# Patient Record
Sex: MALE | Race: WHITE | Employment: FULL TIME | URBAN - NONMETROPOLITAN AREA
[De-identification: names, ages, dates, MRNs, and addresses within clinical notes are randomized per-mention and may not be internally consistent; named-entity substitution may affect disease eponyms.]

---

## 2017-08-06 ENCOUNTER — HOSPITAL ENCOUNTER (EMERGENCY)
Age: 27
Discharge: HOME | End: 2017-08-06
Payer: MEDICAID

## 2017-08-06 VITALS
BODY MASS INDEX: 25.07 KG/M2 | WEIGHT: 190 LBS | DIASTOLIC BLOOD PRESSURE: 65 MMHG | HEART RATE: 81 BPM | RESPIRATION RATE: 16 BRPM | OXYGEN SATURATION: 99 % | TEMPERATURE: 98 F | SYSTOLIC BLOOD PRESSURE: 126 MMHG

## 2017-08-06 DIAGNOSIS — L02.91 ABSCESS: Primary | ICD-10-CM

## 2017-08-06 PROCEDURE — 99282 EMERGENCY DEPT VISIT SF MDM: CPT

## 2017-08-06 RX ORDER — PHENOL 1.4 %
10 AEROSOL, SPRAY (ML) MUCOUS MEMBRANE NIGHTLY
COMMUNITY

## 2017-08-06 RX ORDER — CETIRIZINE HYDROCHLORIDE 10 MG/1
10 TABLET ORAL DAILY
COMMUNITY
End: 2022-05-05

## 2017-08-06 RX ORDER — CEPHALEXIN 500 MG/1
500 CAPSULE ORAL 4 TIMES DAILY
Qty: 40 CAPSULE | Refills: 0 | Status: SHIPPED | OUTPATIENT
Start: 2017-08-06 | End: 2017-08-16

## 2017-08-06 RX ORDER — SULFAMETHOXAZOLE AND TRIMETHOPRIM 800; 160 MG/1; MG/1
1 TABLET ORAL 2 TIMES DAILY
Qty: 20 TABLET | Refills: 0 | Status: SHIPPED | OUTPATIENT
Start: 2017-08-06 | End: 2017-08-16

## 2017-08-06 ASSESSMENT — ENCOUNTER SYMPTOMS
WHEEZING: 0
EYE PAIN: 0
SINUS PRESSURE: 0
EYE DISCHARGE: 0
BACK PAIN: 0
RHINORRHEA: 0
TROUBLE SWALLOWING: 0
NAUSEA: 0
ROS SKIN COMMENTS: BUMP
DIARRHEA: 0
VOMITING: 0
COUGH: 0
ABDOMINAL PAIN: 0

## 2017-08-06 ASSESSMENT — PAIN DESCRIPTION - PAIN TYPE: TYPE: ACUTE PAIN

## 2017-08-06 ASSESSMENT — PAIN SCALES - WONG BAKER: WONGBAKER_NUMERICALRESPONSE: 8

## 2017-10-16 ENCOUNTER — APPOINTMENT (OUTPATIENT)
Dept: GENERAL RADIOLOGY | Age: 27
End: 2017-10-16
Payer: MEDICAID

## 2017-10-16 ENCOUNTER — HOSPITAL ENCOUNTER (EMERGENCY)
Age: 27
Discharge: HOME | End: 2017-10-16
Attending: EMERGENCY MEDICINE
Payer: MEDICAID

## 2017-10-16 VITALS
OXYGEN SATURATION: 100 % | DIASTOLIC BLOOD PRESSURE: 71 MMHG | TEMPERATURE: 98.4 F | HEART RATE: 71 BPM | SYSTOLIC BLOOD PRESSURE: 126 MMHG | RESPIRATION RATE: 16 BRPM

## 2017-10-16 DIAGNOSIS — S16.1XXA STRAIN OF NECK MUSCLE, INITIAL ENCOUNTER: Primary | ICD-10-CM

## 2017-10-16 PROCEDURE — 6370000000 HC RX 637 (ALT 250 FOR IP): Performed by: EMERGENCY MEDICINE

## 2017-10-16 PROCEDURE — 72050 X-RAY EXAM NECK SPINE 4/5VWS: CPT

## 2017-10-16 PROCEDURE — 99283 EMERGENCY DEPT VISIT LOW MDM: CPT

## 2017-10-16 RX ORDER — ACETAMINOPHEN 325 MG/1
650 TABLET ORAL ONCE
Status: COMPLETED | OUTPATIENT
Start: 2017-10-16 | End: 2017-10-16

## 2017-10-16 RX ORDER — CYCLOBENZAPRINE HCL 10 MG
5 TABLET ORAL 2 TIMES DAILY
Status: DISCONTINUED | OUTPATIENT
Start: 2017-10-16 | End: 2017-10-16 | Stop reason: HOSPADM

## 2017-10-16 RX ORDER — CYCLOBENZAPRINE HCL 10 MG
10 TABLET ORAL ONCE
Status: COMPLETED | OUTPATIENT
Start: 2017-10-16 | End: 2017-10-16

## 2017-10-16 RX ADMIN — CYCLOBENZAPRINE HYDROCHLORIDE 10 MG: 10 TABLET, FILM COATED ORAL at 19:28

## 2017-10-16 RX ADMIN — ACETAMINOPHEN 650 MG: 325 TABLET, FILM COATED ORAL at 19:28

## 2017-10-16 ASSESSMENT — PAIN SCALES - GENERAL: PAINLEVEL_OUTOF10: 8

## 2017-10-16 ASSESSMENT — PAIN DESCRIPTION - PAIN TYPE: TYPE: ACUTE PAIN

## 2017-10-16 ASSESSMENT — PAIN DESCRIPTION - ONSET: ONSET: ON-GOING

## 2017-10-16 ASSESSMENT — PAIN DESCRIPTION - FREQUENCY: FREQUENCY: CONTINUOUS

## 2017-10-16 ASSESSMENT — PAIN DESCRIPTION - ORIENTATION: ORIENTATION: POSTERIOR

## 2017-10-16 ASSESSMENT — PAIN DESCRIPTION - LOCATION: LOCATION: NECK

## 2017-10-16 ASSESSMENT — PAIN DESCRIPTION - DESCRIPTORS: DESCRIPTORS: ACHING

## 2019-08-28 ENCOUNTER — HOSPITAL ENCOUNTER (OUTPATIENT)
Dept: PHYSICAL THERAPY | Age: 29
Setting detail: THERAPIES SERIES
Discharge: HOME | End: 2019-08-28
Payer: MEDICAID

## 2019-08-28 PROCEDURE — 97161 PT EVAL LOW COMPLEX 20 MIN: CPT

## 2019-08-28 ASSESSMENT — PAIN SCALES - WONG BAKER: WONGBAKER_NUMERICALRESPONSE: 0

## 2019-08-28 NOTE — PROGRESS NOTES
Phone: [address] [place]         Fax: [phone]                      Outpatient Physical Therapy                                                                      Evaluation    Date: 2019  Patient: Marita Granados  : 1990  ACCT #: [de-identified]    Referring Practitioner: Franca Gallego CNP     Referral Date : 19    Diagnosis: Achilles tightness/pain    Treatment Diagnosis: LE tightness  Onset Date: 19  PT Insurance Information: ALAYNA    Per Physician Order  Total # of Visits to Date: 1  No Show: 1  Canceled Appointment: 0     Subjective  Subjective: Due to work schedule patient able to attend treatment 1 day per week ()  Additional Pertinent Hx: Patient with cerebral palsy which has affected his gait pattern. Patients mother states he has worn AFO's previously however currently is not due to improper fit. He has begun to toe walk promoting tightness of the posterior LE musculature. He notes discomfort with prolonged standing, walking or squatting activities. He has not formal MD follow-up; he reports being placed on muscle relaxers. PMHx includes cerebral palsy and learning disability.   Pain Screening  Patient Currently in Pain: Yes  Pain Assessment  Pain Assessment: Faces  Fishman-Baker Pain Rating: No hurt(increases to 8/10 with walking or squatting)  Social/Functional History  Lives With: Family  Ambulation Assistance: Independent  Transfer Assistance: Independent  Active : No  Occupation: Part time employment  Type of occupation: [address]: Enjoys baseball, gardening/plants       Objective  Vision  Vision: Within Functional Limits  Hearing  Hearing: Within functional limits        Strength RLE  Strength RLE: WFL  AROM RLE (degrees)  RLE AROM: WFL  RLE General AROM: HS 90 ROM to 40 terminal extension  Strength LLE  Strength LLE: WFL  AROM LLE (degrees)  LLE AROM : WFL  LLE

## 2019-08-28 NOTE — PRE-CERTIFICATION NOTE
Medicare Cap     [] Physical Therapy  [] Speech Therapy  [] Occupational therapy    *PT and Speech caps combine      $6224 Cap limit < kx modifier needed < $8722 requires pre-cert     Patient Name: Thirza Skiff  YOB: 1990     Date of [address] Name $$$ charge Daily Charge YTD   Total $   8/28/19 Eval 83.06 83.06 83.06

## 2019-09-04 ENCOUNTER — HOSPITAL ENCOUNTER (OUTPATIENT)
Dept: PHYSICAL THERAPY | Age: 29
Setting detail: THERAPIES SERIES
Discharge: HOME | End: 2019-09-04
Payer: MEDICAID

## 2019-09-04 PROCEDURE — 97110 THERAPEUTIC EXERCISES: CPT

## 2019-09-04 ASSESSMENT — PAIN SCALES - WONG BAKER: WONGBAKER_NUMERICALRESPONSE: 0

## 2019-09-04 NOTE — PRE-CERTIFICATION NOTE
Medicare Cap     [] Physical Therapy  [] Speech Therapy  [] Occupational therapy    *PT and Speech caps combine      $1435 Cap limit < kx modifier needed < $6419 requires pre-cert     Patient Name: Zhou Quiroz  YOB: 1990     Date of [address] Name $$$ charge Daily Charge YTD   Total $   8/28/19 Eval 83.06 83.06 83.06   9/4/19 Ex x 2 30.16 x 2 60.32 143.38

## 2019-09-18 ENCOUNTER — HOSPITAL ENCOUNTER (OUTPATIENT)
Dept: PHYSICAL THERAPY | Age: 29
Setting detail: THERAPIES SERIES
Discharge: HOME | End: 2019-09-18
Payer: MEDICAID

## 2019-09-18 PROCEDURE — 97110 THERAPEUTIC EXERCISES: CPT

## 2019-09-18 ASSESSMENT — PAIN SCALES - GENERAL: PAINLEVEL_OUTOF10: 2

## 2019-09-25 ENCOUNTER — HOSPITAL ENCOUNTER (OUTPATIENT)
Dept: PHYSICAL THERAPY | Age: 29
Setting detail: THERAPIES SERIES
Discharge: HOME | End: 2019-09-25
Payer: MEDICAID

## 2019-09-25 ENCOUNTER — APPOINTMENT (OUTPATIENT)
Dept: PHYSICAL THERAPY | Age: 29
End: 2019-09-25
Payer: MEDICAID

## 2019-09-25 PROCEDURE — 97110 THERAPEUTIC EXERCISES: CPT

## 2019-09-25 NOTE — PRE-CERTIFICATION NOTE
Medicare Cap     [] Physical Therapy  [] Speech Therapy  [] Occupational therapy    *PT and Speech caps combine      $1940 Cap limit < kx modifier needed < $2435 requires pre-cert     Patient Name: Stefania Chen  YOB: 1990     Date of [address] Name $$$ charge Daily Charge YTD   Total $   8/28/19 Eval 83.06 83.06 83.06   9/4/19 Ex x 2 30.16 x 2 60.32 143.38   9/18/19 Ex x 3 30.16 x 3 90.48 233.86   9/25/19 Ex x 3

## 2019-09-25 NOTE — PROGRESS NOTES
Phone: [address] [facility]      Fax: [phone]                            Outpatient Physical Therapy                                                                            Daily Note    Date: 2019  Patient Name: Shahrzad Puente        MRN: [id number]   ACCT#:  [de-identified]  : 1990  (25 y.o.)    Referring Practitioner: Cara Aguilar CNP     Referral Date : 19    Diagnosis: Achilles tightness/pain  Treatment Diagnosis: LE tightness    Onset Date: 19  PT Insurance Information: ALAYNA    Per Physician Order  Total # of Visits to Date: 4  No Show: 1  Canceled Appointment: 0  Plan of Care/Certification Expiration Date: 10/23/19    Pre-Treatment Pain:  2/10  Subjective: Due to work schedule patient able to attend treatment 1 day per week ()  Assessment  Assessment: Patient notes mild discomfort with squatting activities. Minimal discomfort with walking. Good tolerance to exercise however notes moderate tightness with manual HS stretching. Patient out of town next  week however willr esume on 10/9/19 and will assess need for continued PT based on status.   Chart Reviewed: Yes    Plan  Plan: Continue with current plan    Exercises/Modalities/Manual:  See DocFlow Sheet    Education:           Goals  (Total # of Visits to Date: 4)   Short Term Goals - Time Frame for Short term goals: 8 visits -  expires 10/23/19  Short term goal 1: Educate on home exercises for LE stretching with focus on hamstrings and Achilles  Short term goal 2: Decrease subjective LE pain to <4/10 on Fishman-Baker scale with squatting or walking activities             Long Term Goals -    Long term goal 1: LTG = STG as patient able to attend on 1x per week due to work schedule                Post Treatment Pain:  2/10    Time In: 0900    Time Out : 945        Timed Code Treatment Minutes: 40 Minutes  Total Treatment Time: 39 [id number]     Date: 9/25/2019

## 2019-10-09 ENCOUNTER — APPOINTMENT (OUTPATIENT)
Dept: PHYSICAL THERAPY | Age: 29
End: 2019-10-09
Payer: MEDICAID

## 2019-10-23 ENCOUNTER — HOSPITAL ENCOUNTER (OUTPATIENT)
Dept: PHYSICAL THERAPY | Age: 29
Setting detail: THERAPIES SERIES
Discharge: HOME | End: 2019-10-23
Payer: MEDICAID

## 2019-10-23 PROCEDURE — 97110 THERAPEUTIC EXERCISES: CPT

## 2019-10-23 ASSESSMENT — PAIN SCALES - GENERAL: PAINLEVEL_OUTOF10: 1

## 2020-01-01 ENCOUNTER — HOSPITAL ENCOUNTER (EMERGENCY)
Age: 30
Discharge: HOME | End: 2020-01-01
Attending: EMERGENCY MEDICINE
Payer: MEDICAID

## 2020-01-01 VITALS
RESPIRATION RATE: 20 BRPM | DIASTOLIC BLOOD PRESSURE: 76 MMHG | HEART RATE: 81 BPM | OXYGEN SATURATION: 97 % | BODY MASS INDEX: 29.95 KG/M2 | WEIGHT: 227 LBS | TEMPERATURE: 98.6 F | SYSTOLIC BLOOD PRESSURE: 125 MMHG

## 2020-01-01 PROCEDURE — 96372 THER/PROPH/DIAG INJ SC/IM: CPT

## 2020-01-01 PROCEDURE — 6360000002 HC RX W HCPCS: Performed by: EMERGENCY MEDICINE

## 2020-01-01 PROCEDURE — 6370000000 HC RX 637 (ALT 250 FOR IP): Performed by: EMERGENCY MEDICINE

## 2020-01-01 PROCEDURE — 99282 EMERGENCY DEPT VISIT SF MDM: CPT

## 2020-01-01 RX ORDER — FAMOTIDINE 20 MG/1
20 TABLET, FILM COATED ORAL 2 TIMES DAILY
Qty: 14 TABLET | Refills: 0 | Status: SHIPPED | OUTPATIENT
Start: 2020-01-01 | End: 2020-01-08

## 2020-01-01 RX ORDER — METHYLPREDNISOLONE SODIUM SUCCINATE 125 MG/2ML
125 INJECTION, POWDER, LYOPHILIZED, FOR SOLUTION INTRAMUSCULAR; INTRAVENOUS ONCE
Status: COMPLETED | OUTPATIENT
Start: 2020-01-01 | End: 2020-01-01

## 2020-01-01 RX ORDER — TOPIRAMATE 25 MG/1
25 TABLET ORAL 2 TIMES DAILY
COMMUNITY

## 2020-01-01 RX ORDER — DIPHENHYDRAMINE HCL 25 MG
25 TABLET ORAL ONCE
Status: COMPLETED | OUTPATIENT
Start: 2020-01-01 | End: 2020-01-01

## 2020-01-01 RX ORDER — DIPHENHYDRAMINE HCL 25 MG
25 CAPSULE ORAL EVERY 4 HOURS PRN
Qty: 30 CAPSULE | Refills: 0 | Status: SHIPPED | OUTPATIENT
Start: 2020-01-01 | End: 2020-01-06

## 2020-01-01 RX ORDER — PREDNISONE 20 MG/1
40 TABLET ORAL DAILY
Qty: 10 TABLET | Refills: 0 | Status: SHIPPED | OUTPATIENT
Start: 2020-01-01 | End: 2020-01-06

## 2020-01-01 RX ADMIN — METHYLPREDNISOLONE SODIUM SUCCINATE 125 MG: 125 INJECTION, POWDER, FOR SOLUTION INTRAMUSCULAR; INTRAVENOUS at 14:41

## 2020-01-01 RX ADMIN — DIPHENHYDRAMINE HCL 25 MG: 25 TABLET, COATED ORAL at 14:41

## 2020-01-04 NOTE — ED PROVIDER NOTES
None   Occupational History    None   Social Needs    Financial resource strain: None    Food insecurity:     Worry: None     Inability: None    Transportation needs:     Medical: None     Non-medical: None   Tobacco Use    Smoking status: Never Smoker    Smokeless tobacco: Never Used   Substance and Sexual Activity    Alcohol use: No    Drug use: None    Sexual activity: None   Lifestyle    Physical activity:     Days per week: None     Minutes per session: None    Stress: None   Relationships    Social connections:     Talks on phone: None     Gets together: None     Attends [employer] service: None     Active member of club or organization: None     Attends meetings of clubs or organizations: None     Relationship status: None    Intimate partner violence:     Fear of current or ex partner: None     Emotionally abused: None     Physically abused: None     Forced sexual activity: None   Other Topics Concern    None   Social History Narrative    None       REVIEW OF SYSTEMS    Constitutional:  Denies fever, chills, weight loss or weakness   Eyes:  Denies photophobia or discharge   HENT:  Denies sore throat or ear pain   Respiratory:  Denies cough or shortness of breath   Cardiovascular:  Denies chest pain, palpitations or swelling   GI:  Denies abdominal pain, nausea, vomiting, or diarrhea   Musculoskeletal:  Denies back pain   Skin: Complains of rash   Neurologic:  Denies headache, focal weakness or sensory changes   Endocrine:  Denies polyuria or polydypsia   Lymphatic:  Denies swollen glands   Psychiatric:  Denies depression, suicidal ideation or homicidal ideation   All systems negative except as marked. PHYSICAL EXAM    VITAL SIGNS: /76   Pulse 81   Temp 98.6 °F (37 °C) (Oral)   Resp 20   Wt 227 lb (103 kg)   SpO2 97%   BMI 29.95 kg/m²    Constitutional:  Well developed, Well nourished, No acute distress, Non-toxic appearance.    HENT:  Normocephalic, Atraumatic, Bilateral external ears normal, Oropharynx moist, No oral exudates, uvula normal, nose normal. Neck- Normal range of motion, No tenderness, Supple, No stridor. Eyes:  PERRL, EOMI, Conjunctiva normal, No discharge. Respiratory:  Normal breath sounds, No respiratory distress, No wheezing, No chest tenderness. Cardiovascular:  Normal heart rate, Normal rhythm, No murmurs, No rubs, No gallops. GI:  Bowel sounds normal, Soft, No tenderness, No masses, No pulsatile masses. : No CVA tenderness. Musculoskeletal:  Intact distal pulses, No edema, No tenderness, No cyanosis, No clubbing. Good range of motion in all major joints. No tenderness to palpation or major deformities noted. Back- No tenderness. Integument:  Warm, Dry, maculopapular blanching rash present on anterior chest and posterior chest area, bilateral lower extremity and upper extremity. Excoriation marks present  lymphatic:  No lymphadenopathy noted. Neurologic:  Alert & oriented x 3, Normal motor function, Normal sensory function, No focal deficits noted. Psychiatric:  Affect normal, Judgment normal, Mood normal.         RADIOLOGY    No orders to display       REEVALUATION   Patient feels better after the Solu-Medrol and Benadryl.             Summation      Patient Course:     ED Medications administered this visit:    Medications   methylPREDNISolone sodium (SOLU-MEDROL) injection 125 mg (125 mg Intramuscular Given 1/1/20 1441)   diphenhydrAMINE (BENADRYL) tablet 25 mg (25 mg Oral Given 1/1/20 1441)       New Prescriptions from this visit:    Discharge Medication List as of 1/1/2020  2:27 PM      START taking these medications    Details   diphenhydrAMINE (BENADRYL) 25 MG capsule Take 1 capsule by mouth every 4 hours as needed for Itching or Allergies, Disp-30 capsule, R-0Print      predniSONE (DELTASONE) 20 MG tablet Take 2 tablets by mouth daily for 5 days, Disp-10 tablet, R-0Print      famotidine (PEPCID) 20 MG tablet Take 1 tablet by mouth 2 times daily for 7 days, Disp-14 tablet, R-0Print             Follow-up:  ALEJANDRO Finley  [facility] [place] [id number]    Call in 1 day          Final Impression:   1.  Allergic dermatitis               (Please note that portions of this note were completed with a voice recognition program.  Efforts were made to edit the dictations but occasionally words are mis-transcribed.)            Gwendolyn Hill MD  01/04/20 0018

## 2020-01-29 ENCOUNTER — APPOINTMENT (OUTPATIENT)
Dept: PHYSICAL THERAPY | Age: 30
End: 2020-01-29
Payer: MEDICAID

## 2020-02-26 ENCOUNTER — HOSPITAL ENCOUNTER (OUTPATIENT)
Dept: PHYSICAL THERAPY | Age: 30
Setting detail: THERAPIES SERIES
Discharge: HOME | End: 2020-02-26
Payer: MEDICAID

## 2020-02-26 PROCEDURE — 97161 PT EVAL LOW COMPLEX 20 MIN: CPT

## 2020-02-26 ASSESSMENT — PAIN SCALES - GENERAL: PAINLEVEL_OUTOF10: 2

## 2020-03-11 ENCOUNTER — HOSPITAL ENCOUNTER (OUTPATIENT)
Dept: PHYSICAL THERAPY | Age: 30
Setting detail: THERAPIES SERIES
Discharge: HOME | End: 2020-03-11
Payer: MEDICAID

## 2020-03-11 PROCEDURE — 97113 AQUATIC THERAPY/EXERCISES: CPT

## 2020-03-11 ASSESSMENT — PAIN SCALES - GENERAL: PAINLEVEL_OUTOF10: 2

## 2020-03-18 ENCOUNTER — HOSPITAL ENCOUNTER (OUTPATIENT)
Dept: PHYSICAL THERAPY | Age: 30
Setting detail: THERAPIES SERIES
Discharge: HOME | End: 2020-03-18
Payer: MEDICAID

## 2020-03-18 PROCEDURE — 97113 AQUATIC THERAPY/EXERCISES: CPT

## 2020-03-18 ASSESSMENT — PAIN SCALES - GENERAL: PAINLEVEL_OUTOF10: 2

## 2020-03-18 NOTE — PROGRESS NOTES
Phone: [address]      Fax: [phone]                            Outpatient Physical Therapy                                                                            Daily Note    Date: 3/18/2020  Patient Name: Linda Titus        MRN: [id number]   ACCT#:  [de-identified]  : 1990  (30 y.o.)    Referring Practitioner: Dr. Kristy Blackman    Referral Date : 12/10/19    Diagnosis: Bilateral leg pain  Treatment Diagnosis: LE pain    Onset Date: 12/10/19  PT Insurance Information: ALAYNA    Per Physician Order  Total # of Visits to Date: 3  No Show: 0  Canceled Appointment: 0  Plan of Care/Certification Expiration Date: 20    Pre-Treatment Pain:  2/10  Subjective: Due to work schedule patient able to attend treatment 1 day per week ()  Assessment  Assessment: Patient reports LE pain is a 2/10 this morning. Continued with aquatic exercises as outlined. Patient continues to require verbal cueing to copmlete exercises with proper form and remain on task. Following session patient reports LE pain is still a 2/10.   Chart Reviewed: Yes    Plan  Plan: Continue with current plan    Exercises/Modalities/Manual:  See DocFlow Sheet    Education:           Goals  (Total # of Visits to Date: 3)   Short Term Goals - Time Frame for Short term goals: 6 visits- 20  Short term goal 1: Educate on home exercises for LE stretching with focus on hamstrings and Achilles;  educate on aquatic ex as appropriate for patient to progress at local health facility  Short term goal 2: Decrease subjective LE pain to <3/10 on Fishman-Baker scale with squatting or walking activities-             Long Term Goals -    Long term goal 1: LTG = STG as patient able to attend on 1x per week due to work schedule                Post Treatment Pain:  2/10    Time In: 1135    Time Out : 1215   Timed Code Treatment Minutes: 30 Minutes  Total Treatment Time: 40(clothing change time) Minutes    Magi De King Elijah, PTA     Date: 3/18/2020

## 2020-03-25 ENCOUNTER — APPOINTMENT (OUTPATIENT)
Dept: PHYSICAL THERAPY | Age: 30
End: 2020-03-25
Payer: MEDICAID

## 2020-05-13 ENCOUNTER — HOSPITAL ENCOUNTER (OUTPATIENT)
Dept: PHYSICAL THERAPY | Age: 30
Setting detail: THERAPIES SERIES
Discharge: HOME | End: 2020-05-13
Payer: MEDICAID

## 2020-05-13 PROCEDURE — 97110 THERAPEUTIC EXERCISES: CPT

## 2020-05-13 ASSESSMENT — PAIN SCALES - GENERAL: PAINLEVEL_OUTOF10: 1

## 2020-05-20 ENCOUNTER — HOSPITAL ENCOUNTER (OUTPATIENT)
Dept: PHYSICAL THERAPY | Age: 30
Setting detail: THERAPIES SERIES
Discharge: HOME | End: 2020-05-20
Payer: MEDICAID

## 2020-05-20 PROCEDURE — 97110 THERAPEUTIC EXERCISES: CPT

## 2020-05-20 ASSESSMENT — PAIN SCALES - GENERAL: PAINLEVEL_OUTOF10: 1

## 2020-05-27 ENCOUNTER — HOSPITAL ENCOUNTER (OUTPATIENT)
Dept: PHYSICAL THERAPY | Age: 30
Setting detail: THERAPIES SERIES
Discharge: HOME | End: 2020-05-27
Payer: MEDICAID

## 2020-05-27 PROCEDURE — 97110 THERAPEUTIC EXERCISES: CPT

## 2020-05-27 ASSESSMENT — PAIN SCALES - GENERAL: PAINLEVEL_OUTOF10: 1

## 2020-05-27 NOTE — PROGRESS NOTES
Phone: [address]      Fax: [phone]                            Outpatient Physical Therapy                                                                            Daily Note    Date: 2020  Patient Name: Lui Montero        MRN: [id number]   ACCT#:  [de-identified]  : 1990  (30 y.o.)    Referring Practitioner: Dr. Christiano Pa    Referral Date : 12/10/19    Diagnosis: Bilateral leg pain  Treatment Diagnosis: LE pain    Onset Date: 12/10/19  PT Insurance Information: ALAYNA    Per Physician Order  Total # of Visits to Date: 6  No Show: 0  Canceled Appointment: 0  Plan of Care/Certification Expiration Date: 20    Pre-Treatment Pain:  1/10  Subjective: Due to work schedule patient able to attend treatment 1 day per week ()  Assessment  Assessment: Patient states pain is a 1/10 this afternoon. Continued with stretching and strengthening exercises as outlined. Added treadmill @ 2.8mph x3 minutes to work on gait pattern. Patient required VCs for proper exercise form. Following session patient notes pain is still a 1/10.   Chart Reviewed: Yes    Plan  Plan: Continue with current plan    Exercises/Modalities/Manual:  See DocFlow Sheet    Education:           Goals  (Total # of Visits to Date: 6)   Short Term Goals -                     Long Term Goals - Time Frame for Long term goals : 6 visits - expires 20  Long term goal 1: Educate on home program to improve flexibility of HS (<25 deg HS 90 stretch)  to improve gait pattern  Long term goal 2: Decrease subjective LE pain to <2/10 on Fishman-Baker scale with squatting or walking activities-             Post Treatment Pain:  1/10    Time In: 1525    Time Out : 1600   Timed Code Treatment Minutes: 35 Minutes  Total Treatment Time: Arun [address] [place] [address]     Date: 2020

## 2020-05-28 ENCOUNTER — HOSPITAL ENCOUNTER (EMERGENCY)
Age: 30
Discharge: HOME | End: 2020-05-28
Attending: EMERGENCY MEDICINE
Payer: MEDICAID

## 2020-05-28 VITALS
WEIGHT: 224.9 LBS | TEMPERATURE: 98.7 F | BODY MASS INDEX: 30.46 KG/M2 | HEIGHT: 72 IN | SYSTOLIC BLOOD PRESSURE: 131 MMHG | OXYGEN SATURATION: 99 % | DIASTOLIC BLOOD PRESSURE: 78 MMHG | HEART RATE: 87 BPM | RESPIRATION RATE: 18 BRPM

## 2020-05-28 PROCEDURE — 99282 EMERGENCY DEPT VISIT SF MDM: CPT

## 2020-05-28 RX ORDER — TRIAMCINOLONE ACETONIDE 1 MG/G
CREAM TOPICAL
Qty: 80 G | Refills: 0 | Status: SHIPPED | OUTPATIENT
Start: 2020-05-28 | End: 2022-05-05

## 2020-05-28 RX ORDER — PREDNISONE 20 MG/1
TABLET ORAL
Qty: 10 TABLET | Refills: 0 | Status: SHIPPED | OUTPATIENT
Start: 2020-05-28 | End: 2022-05-05

## 2020-05-28 NOTE — ED PROVIDER NOTES
Occupational History    None   Social Needs    Financial resource strain: None    Food insecurity     Worry: None     Inability: None    Transportation needs     Medical: None     Non-medical: None   Tobacco Use    Smoking status: Never Smoker    Smokeless tobacco: Never Used   Substance and Sexual Activity    Alcohol use: No    Drug use: None    Sexual activity: None   Lifestyle    Physical activity     Days per week: None     Minutes per session: None    Stress: None   Relationships    Social connections     Talks on phone: None     Gets together: None     Attends Jehovah's witness service: None     Active member of club or organization: None     Attends meetings of clubs or organizations: None     Relationship status: None    Intimate partner violence     Fear of current or ex partner: None     Emotionally abused: None     Physically abused: None     Forced sexual activity: None   Other Topics Concern    None   Social History Narrative    None       PHYSICAL EXAM    VITAL SIGNS: /78   Pulse 87   Temp 98.7 °F (37.1 °C)   Resp 18   Ht 6' (1.829 m)   Wt 224 lb 14.4 oz (102 kg)   SpO2 99%   BMI 30.50 kg/m²   Constitutional:  Well developed, well nourished, no acute distress, non-toxic appearance   HENT:  Atraumatic, external ears normal, nose normal, oropharynx moist, no pharyngeal exudates. Neck- supple   Respiratory:  No respiratory distress, normal breath sounds   Cardiovascular:  Normal rate, normal rhythm, no murmurs, no gallops, no rubs   GI:  Soft, nondistended, normal bowel sounds, nontender, no organomegaly   Musculoskeletal:  No edema, no tenderness, no deformities. Integument: Papular rash over the trunk and extremities. Poison ivy dermatitis    RADIOLOGY/PROCEDURES    No orders to display         Labs  Labs Reviewed - No data to display        Summation      Patient Course: Prevention is discussed. Patient is prescribed triamcinolone cream and prednisone.   For itching patient

## 2020-06-10 ENCOUNTER — HOSPITAL ENCOUNTER (OUTPATIENT)
Dept: PHYSICAL THERAPY | Age: 30
Setting detail: THERAPIES SERIES
Discharge: HOME | End: 2020-06-10
Payer: MEDICAID

## 2020-06-10 PROCEDURE — 97113 AQUATIC THERAPY/EXERCISES: CPT

## 2020-06-10 ASSESSMENT — PAIN SCALES - GENERAL: PAINLEVEL_OUTOF10: 1

## 2020-06-10 NOTE — PROGRESS NOTES
Minutes: 30 Minutes  Total Treatment Time: 40(clothing change time) Minutes    Miya Paul [place]     Date: 6/10/2020

## 2020-06-12 ENCOUNTER — APPOINTMENT (OUTPATIENT)
Dept: PHYSICAL THERAPY | Age: 30
End: 2020-06-12
Payer: MEDICAID

## 2020-06-17 ENCOUNTER — HOSPITAL ENCOUNTER (OUTPATIENT)
Dept: PHYSICAL THERAPY | Age: 30
Setting detail: THERAPIES SERIES
Discharge: HOME | End: 2020-06-17
Payer: MEDICAID

## 2020-06-17 PROCEDURE — 97113 AQUATIC THERAPY/EXERCISES: CPT

## 2020-06-17 ASSESSMENT — PAIN SCALES - GENERAL: PAINLEVEL_OUTOF10: 2

## 2020-06-17 NOTE — PROGRESS NOTES
Phone: [address]      Fax: [phone]                            Outpatient Physical Therapy                                                                            Daily Note    Date: 2020  Patient Name: Kristen Mayes        MRN: [id number]   ACCT#:  [de-identified]  : 1990  (30 y.o.)    Referring Practitioner: Dr. Derrick Briggs    Referral Date : 12/10/19    Diagnosis: Bilateral leg pain  Treatment Diagnosis: LE pain    Onset Date: 12/10/19  PT Insurance Information: ALAYNA    Per Physician Order  Total # of Visits to Date: 8  No Show: 0  Canceled Appointment: 0  Plan of Care/Certification Expiration Date: 20    Pre-Treatment Pain:  1-2/10  Subjective: Due to work schedule patient able to attend treatment 1 day per week ()  Assessment  Assessment: Patient notes B/L LE pain is a 1-2/10 this afternoon. Completed aquatic exercises as outlined. Educated patient on HEP he can complete in his pool as well as stretches to complete on land. Advised patient to ice when he gets sore spots following work days to help with pain. Will D/C at this time.    Chart Reviewed: Yes    Plan  Plan: Discharge    Exercises/Modalities/Manual:  See DocFlow Sheet    Education:           Goals  (Total # of Visits to Date: 8)   Short Term Goals - Time Frame for Short term goals: 6 visits- 20  Short term goal 1: Educate on home exercises for LE stretching with focus on hamstrings and Achilles;  educate on aquatic ex as appropriate for patient to progress at local health facility - [facility]  Short term goal 2: Decrease subjective LE pain to <3/10 on Fishamn-Baker scale with squatting or walking activities - NOT [facility]             Long Term Goals -    Long term goal 1: LTG = STG as patient able to attend on 1x per week due to work schedule                Post Treatment Pain:  0/10    Time In: 1520    Time Out : 1600   Timed Code Treatment Minutes: 30 Minutes  Total Treatment Time: 40(clothing change time) Minutes    Surinder Lynn [place]     Date: 6/17/2020

## 2021-02-18 ENCOUNTER — HOSPITAL ENCOUNTER (OUTPATIENT)
Age: 31
Setting detail: SPECIMEN
Discharge: HOME | End: 2021-02-18
Payer: MEDICAID

## 2021-02-18 ENCOUNTER — OFFICE VISIT (OUTPATIENT)
Dept: PRIMARY CARE CLINIC | Age: 31
End: 2021-02-18
Payer: MEDICAID

## 2021-02-18 VITALS
RESPIRATION RATE: 20 BRPM | WEIGHT: 229.7 LBS | BODY MASS INDEX: 31.11 KG/M2 | HEIGHT: 72 IN | DIASTOLIC BLOOD PRESSURE: 111 MMHG | TEMPERATURE: 98.6 F | HEART RATE: 77 BPM | OXYGEN SATURATION: 98 % | SYSTOLIC BLOOD PRESSURE: 150 MMHG

## 2021-02-18 DIAGNOSIS — Z20.822 SUSPECTED COVID-19 VIRUS INFECTION: Primary | ICD-10-CM

## 2021-02-18 DIAGNOSIS — Z20.822 SUSPECTED COVID-19 VIRUS INFECTION: ICD-10-CM

## 2021-02-18 PROCEDURE — C9803 HOPD COVID-19 SPEC COLLECT: HCPCS

## 2021-02-18 PROCEDURE — U0003 INFECTIOUS AGENT DETECTION BY NUCLEIC ACID (DNA OR RNA); SEVERE ACUTE RESPIRATORY SYNDROME CORONAVIRUS 2 (SARS-COV-2) (CORONAVIRUS DISEASE [COVID-19]), AMPLIFIED PROBE TECHNIQUE, MAKING USE OF HIGH THROUGHPUT TECHNOLOGIES AS DESCRIBED BY CMS-2020-01-R: HCPCS

## 2021-02-18 PROCEDURE — 99202 OFFICE O/P NEW SF 15 MIN: CPT | Performed by: NURSE PRACTITIONER

## 2021-02-18 PROCEDURE — U0005 INFEC AGEN DETEC AMPLI PROBE: HCPCS

## 2021-02-18 RX ORDER — CYCLOBENZAPRINE HCL 10 MG
TABLET ORAL
COMMUNITY
Start: 2021-02-01

## 2021-02-18 ASSESSMENT — ENCOUNTER SYMPTOMS
RHINORRHEA: 1
ALLERGIC/IMMUNOLOGIC NEGATIVE: 1
SORE THROAT: 0
DIARRHEA: 1
COUGH: 1
EYES NEGATIVE: 1
NAUSEA: 1

## 2021-02-18 NOTE — LETTER
[address] [facility], [facility]      CAM Smith CNP      2/18/2021     Patient: Buck Chavez   YOB: 1990       To Whom It May Concern: It is my medical opinion that Buck Chavez should remain out of school/work while acutely ill and awaiting COVID-19 test results. Return to school/work with no retesting should be followed if test is negative AND meets these 3 criteria as outlined by CDC/[facility]H:     a. No fever without the use of fever reducers for 24 hours  b. Improvement in symptoms  c. At least 7 days since the onset of symptoms. If tests positive for COVID-19, needs minimum of 10 days strict quarantine, improvement of symptoms and 24 hours fever free without fever reducing medications. If you have any questions or concerns, please don't hesitate to call.     Sincerely,          CAM Smith CNP

## 2021-02-18 NOTE — PROGRESS NOTES
Review of Systems   Constitutional: Positive for fatigue. Negative for appetite change, diaphoresis and fever. HENT: Positive for congestion and rhinorrhea. Negative for sore throat. Eyes: Negative. Respiratory: Positive for cough. Cardiovascular: Negative. Gastrointestinal: Positive for diarrhea and nausea. Endocrine: Negative. Genitourinary: Negative. Musculoskeletal: Positive for myalgias. Skin: Negative. Allergic/Immunologic: Negative. Neurological: Positive for headaches. Hematological: Negative. Psychiatric/Behavioral: Negative. Objective:     Physical Exam  Vitals signs and nursing note reviewed. Constitutional:       Appearance: Normal appearance. HENT:      Head: Normocephalic. Right Ear: External ear normal.      Left Ear: External ear normal.      Nose: Congestion and rhinorrhea present. Rhinorrhea is clear. Mouth/Throat:      Mouth: Mucous membranes are moist.      Pharynx: Oropharynx is clear. No posterior oropharyngeal erythema. Tonsils: No tonsillar exudate. 1+ on the right. 1+ on the left. Eyes:      Conjunctiva/sclera: Conjunctivae normal.      Pupils: Pupils are equal, round, and reactive to light. Neck:      Musculoskeletal: Normal range of motion. Cardiovascular:      Rate and Rhythm: Normal rate and regular rhythm. Heart sounds: Normal heart sounds. Pulmonary:      Effort: Pulmonary effort is normal.      Breath sounds: Normal breath sounds. Lymphadenopathy:      Cervical: No cervical adenopathy. Skin:     General: Skin is warm. Capillary Refill: Capillary refill takes less than 2 seconds. Neurological:      General: No focal deficit present. Mental Status: He is alert.    Psychiatric:         Mood and Affect: Mood normal.       BP (!) 150/111   Pulse 77   Temp 98.6 °F (37 °C) (Oral)   Resp 20   Ht 6' (1.829 m)   Wt 229 lb 11.2 oz (104.2 kg)   SpO2 98%   BMI 31.15 kg/m²     Assessment: Diagnosis Orders   1. Suspected COVID-19 virus infection  COVID-19     No results found for this visit on 02/18/21. Plan:   -Advised to self quarantine for 14 days at home or until receives negative results from COVID-19 test.  -May return to work after negative test results, and symptoms improving. No fever for 24 hours. -Advised they will receive test results in 1-3 days.  -Tylenol as needed for fever and comfort. Avoid taking Ibuprofen. -Increase fluids and rest.  -Warm salt water gargles and hot tea with lemon and honey for sore throat.  -Cool mist humidifier  -Mucinex recommended if cough becomes productive. -Recommend Vitamin C 500 mg BID, Vitamin D3 2000 IU daily, Vitamin B complex daily, and Zinc daily to reduce severity of symptoms. -If shortness of breath or chest discomfort develop call Emergency Room before arrival for instructions.  -Follow up with PCP if not improvement after 14 days. No follow-ups on file. No orders of the defined types were placed in this encounter.        Electronically signed by CAM Rascon CNP on 2/18/2021 at 12:45 PM

## 2021-02-19 LAB
SARS-COV-2: NORMAL
SARS-COV-2: NOT DETECTED
SOURCE: NORMAL

## 2021-08-23 ENCOUNTER — HOSPITAL ENCOUNTER (OUTPATIENT)
Dept: PREADMISSION TESTING | Age: 31
Setting detail: SPECIMEN
Discharge: HOME | End: 2021-08-23
Payer: MEDICAID

## 2021-08-23 PROCEDURE — U0005 INFEC AGEN DETEC AMPLI PROBE: HCPCS

## 2021-08-23 PROCEDURE — U0003 INFECTIOUS AGENT DETECTION BY NUCLEIC ACID (DNA OR RNA); SEVERE ACUTE RESPIRATORY SYNDROME CORONAVIRUS 2 (SARS-COV-2) (CORONAVIRUS DISEASE [COVID-19]), AMPLIFIED PROBE TECHNIQUE, MAKING USE OF HIGH THROUGHPUT TECHNOLOGIES AS DESCRIBED BY CMS-2020-01-R: HCPCS

## 2021-08-23 PROCEDURE — C9803 HOPD COVID-19 SPEC COLLECT: HCPCS

## 2021-08-24 LAB
SARS-COV-2: NORMAL
SARS-COV-2: NOT DETECTED
SOURCE: NORMAL

## 2021-11-22 ENCOUNTER — OFFICE VISIT (OUTPATIENT)
Dept: PRIMARY CARE CLINIC | Age: 31
End: 2021-11-22
Payer: MEDICAID

## 2021-11-22 VITALS
TEMPERATURE: 97.9 F | WEIGHT: 221 LBS | SYSTOLIC BLOOD PRESSURE: 138 MMHG | DIASTOLIC BLOOD PRESSURE: 90 MMHG | HEART RATE: 74 BPM | HEIGHT: 71 IN | BODY MASS INDEX: 30.94 KG/M2 | OXYGEN SATURATION: 97 %

## 2021-11-22 DIAGNOSIS — J06.9 UPPER RESPIRATORY TRACT INFECTION, UNSPECIFIED TYPE: Primary | ICD-10-CM

## 2021-11-22 PROBLEM — E66.9 OBESITY: Status: ACTIVE | Noted: 2021-11-22

## 2021-11-22 PROBLEM — L72.0 EPIDERMOID CYST OF SKIN: Status: ACTIVE | Noted: 2021-11-22

## 2021-11-22 PROBLEM — R60.0 SWELLING OF SUBMANDIBULAR GLAND: Status: ACTIVE | Noted: 2021-11-22

## 2021-11-22 PROBLEM — E66.3 OVERWEIGHT WITH BODY MASS INDEX (BMI) 25.0-29.9: Status: ACTIVE | Noted: 2021-11-22

## 2021-11-22 PROBLEM — L23.7 CONTACT DERMATITIS DUE TO POISON IVY: Status: ACTIVE | Noted: 2021-11-22

## 2021-11-22 PROBLEM — G43.909 MIGRAINE HEADACHE: Status: ACTIVE | Noted: 2021-11-22

## 2021-11-22 PROBLEM — F51.04 CHRONIC INSOMNIA: Status: ACTIVE | Noted: 2021-11-22

## 2021-11-22 PROBLEM — K21.9 GASTROESOPHAGEAL REFLUX DISEASE: Status: ACTIVE | Noted: 2021-11-22

## 2021-11-22 PROBLEM — F71 MODERATE INTELLECTUAL DISABILITY: Status: ACTIVE | Noted: 2021-11-22

## 2021-11-22 PROBLEM — R60.9 SWELLING OF SUBMANDIBULAR GLAND: Status: ACTIVE | Noted: 2021-11-22

## 2021-11-22 PROBLEM — F06.4 ANXIETY DISORDER DUE TO GENERAL MEDICAL CONDITION: Status: ACTIVE | Noted: 2021-11-22

## 2021-11-22 PROBLEM — R22.1 MASS OF NECK: Status: ACTIVE | Noted: 2021-11-22

## 2021-11-22 PROBLEM — F42.9 OBSESSIVE-COMPULSIVE DISORDER: Status: ACTIVE | Noted: 2021-11-22

## 2021-11-22 PROBLEM — K29.00 ACUTE EROSIVE GASTRITIS: Status: ACTIVE | Noted: 2017-02-16

## 2021-11-22 PROBLEM — Z20.822 EXPOSURE TO SEVERE ACUTE RESPIRATORY SYNDROME CORONAVIRUS 2 (SARS-COV-2): Status: ACTIVE | Noted: 2021-11-22

## 2021-11-22 PROBLEM — F84.5 ASPERGER'S DISORDER: Status: ACTIVE | Noted: 2021-11-22

## 2021-11-22 PROBLEM — G80.9 CEREBRAL PALSY (HCC): Status: ACTIVE | Noted: 2021-11-22

## 2021-11-22 PROBLEM — F41.1 GENERALIZED ANXIETY DISORDER: Status: ACTIVE | Noted: 2021-11-22

## 2021-11-22 PROBLEM — B35.1 ONYCHOMYCOSIS: Status: ACTIVE | Noted: 2021-11-22

## 2021-11-22 PROBLEM — R20.8 DYSESTHESIA: Status: ACTIVE | Noted: 2021-11-22

## 2021-11-22 PROCEDURE — 99213 OFFICE O/P EST LOW 20 MIN: CPT | Performed by: NURSE PRACTITIONER

## 2021-11-22 ASSESSMENT — ENCOUNTER SYMPTOMS
SORE THROAT: 0
RHINORRHEA: 1
VOMITING: 0
DIARRHEA: 1
NAUSEA: 0
COUGH: 1

## 2021-11-22 NOTE — PATIENT INSTRUCTIONS
Patient Education        Upper Respiratory Infection (Cold): Care Instructions  Your Care Instructions     An upper respiratory infection, or URI, is an infection of the nose, sinuses, or throat. URIs are spread by coughs, sneezes, and direct contact. The common cold is the most frequent kind of URI. The flu and sinus infections are other kinds of URIs. Almost all URIs are caused by viruses. Antibiotics won't cure them. But you can treat most infections with home care. This may include drinking lots of fluids and taking over-the-counter pain medicine. You will probably feel better in 4 to 10 days. The doctor has checked you carefully, but problems can develop later. If you notice any problems or new symptoms, get medical treatment right away. Follow-up care is a key part of your treatment and safety. Be sure to make and go to all appointments, and call your doctor if you are having problems. It's also a good idea to know your test results and keep a list of the medicines you take. How can you care for yourself at home? · To prevent dehydration, drink plenty of fluids. Choose water and other clear liquids until you feel better. If you have kidney, heart, or liver disease and have to limit fluids, talk with your doctor before you increase the amount of fluids you drink. · Take an over-the-counter pain medicine, such as acetaminophen (Tylenol), ibuprofen (Advil, Motrin), or naproxen (Aleve). Read and follow all instructions on the label. · Before you use cough and cold medicines, check the label. These medicines may not be safe for young children or for people with certain health problems. · Be careful when taking over-the-counter cold or flu medicines and Tylenol at the same time. Many of these medicines have acetaminophen, which is Tylenol. Read the labels to make sure that you are not taking more than the recommended dose. Too much acetaminophen (Tylenol) can be harmful.   · Get plenty of rest.  · Do not respiratory infection. · To ER or call 911 if any difficulty breathing, shortness of breath, inability to swallow, hives, rash, facial/tongue swelling or temp greater than 103 degrees. · Follow up with PCP or Walk in Care as needed if symptoms worsen or do not improve.

## 2021-11-22 NOTE — PROGRESS NOTES
[address] [facility] WALK-IN CARE  [address] [facility] [address]  Dept: [phone]  Dept Fax: [phone]     Ya Martins is a 33 y.o. male who presents to the [facility] in Care today for hismedical conditions/complaints as noted below. Ya Martins is c/o of URI (fatgue, cough, stuffy nose, ear fullness, headache x 5 days. Mom did rapid on 11-18, negative)      HPI:     URI   This is a new problem. The current episode started in the past 7 days (Mother reports started on November 18th with moist cough, congestion, ear fullness and headache. Did rapid test and it was negative. Denies known exposure to Covid-1). The problem has been gradually worsening. There has been no fever. Associated symptoms include congestion, coughing, diarrhea and rhinorrhea. Pertinent negatives include no ear pain, headaches, nausea, rash, sore throat or vomiting. Treatments tried: Dayquil. The treatment provided mild relief. Past Medical History:   Diagnosis Date    Anxiety     Autistic disorder     Cerebral palsy (HCC)     Obsessive compulsive disorder         Current Outpatient Medications   Medication Sig Dispense Refill    topiramate (TOPAMAX) 25 MG tablet Take 25 mg by mouth 2 times daily      cetirizine (ZYRTEC) 10 MG tablet Take 10 mg by mouth daily      Melatonin 10 MG TABS Take 10 mg by mouth nightly      PARoxetine (PAXIL) 40 MG tablet Take 40 mg by mouth every morning.  ibuprofen (ADVIL;MOTRIN) 400 MG tablet Take 400 mg by mouth every 6 hours as needed.  cyclobenzaprine (FLEXERIL) 10 MG tablet take 1 tablet by mouth three times a day for 7 days if needed for SPASM (Patient not taking: Reported on 11/22/2021)      predniSONE (DELTASONE) 20 MG tablet 2 tablets daily x3 days then 1 tablet daily (Patient not taking: Reported on 2/18/2021) 10 tablet 0    triamcinolone (KENALOG) 0.1 % cream Apply topically 2 times daily.  (Patient not taking: Reported on 11/22/2021) 80 g 0    famotidine (PEPCID) 20 MG tablet Take 1 tablet by mouth 2 times daily for 7 days 14 tablet 0     No current facility-administered medications for this visit. Allergies   Allergen Reactions    Latex Dermatitis       Subjective:     Review of Systems   Constitutional: Positive for fatigue and fever (Tactile). Negative for appetite change, chills and diaphoresis. HENT: Positive for congestion and rhinorrhea. Negative for ear pain and sore throat. Respiratory: Positive for cough. Gastrointestinal: Positive for diarrhea. Negative for nausea and vomiting. Skin: Negative for rash and wound. Neurological: Negative for dizziness, light-headedness and headaches. Objective:      Physical Exam  Vitals and nursing note reviewed. Constitutional:       General: He is not in acute distress. Appearance: Normal appearance. He is well-developed. He is not ill-appearing or diaphoretic. Comments: Arrives ambulatory with mother. Well hydrated, nontoxic appearance. HENT:      Head: Normocephalic and atraumatic. Right Ear: Hearing, tympanic membrane, ear canal and external ear normal. No middle ear effusion. No mastoid tenderness. Tympanic membrane is not injected, erythematous or bulging. Left Ear: Hearing, tympanic membrane, ear canal and external ear normal.  No middle ear effusion. No mastoid tenderness. Tympanic membrane is not injected, erythematous or bulging. Nose: Mucosal edema, congestion and rhinorrhea present. Rhinorrhea is clear. Right Sinus: No maxillary sinus tenderness or frontal sinus tenderness. Left Sinus: No maxillary sinus tenderness or frontal sinus tenderness. Mouth/Throat:      Lips: Pink. Mouth: Mucous membranes are moist.      Pharynx: Oropharynx is clear. Uvula midline. No pharyngeal swelling, oropharyngeal exudate or posterior oropharyngeal erythema. Eyes:      General:         Right eye: No discharge. Left eye: No discharge. Conjunctiva/sclera: Conjunctivae normal.      Pupils: Pupils are equal, round, and reactive to light. Cardiovascular:      Rate and Rhythm: Normal rate and regular rhythm. Heart sounds: Normal heart sounds, S1 normal and S2 normal. No murmur heard. No friction rub. No gallop. Pulmonary:      Effort: Pulmonary effort is normal. No accessory muscle usage or respiratory distress. Breath sounds: Normal breath sounds and air entry. No decreased breath sounds, wheezing, rhonchi or rales. Comments:   Rare moist cough,  Breath sounds clear B/L anterior and posterior lobes. Chest expansion symmetrical.  No audible wheezing or respiratory distress. No rales or rhonchi. Abdominal:      General: Bowel sounds are normal.      Palpations: Abdomen is soft. Tenderness: There is no abdominal tenderness. Musculoskeletal:         General: Normal range of motion. Lymphadenopathy:      Cervical: No cervical adenopathy. Right cervical: No superficial or posterior cervical adenopathy. Left cervical: No superficial or posterior cervical adenopathy. Skin:     General: Skin is warm and dry. Coloration: Skin is not pale. Findings: No erythema or rash. Neurological:      Mental Status: He is alert and oriented to person, place, and time. Psychiatric:         Behavior: Behavior normal. Behavior is cooperative. BP (!) 138/90   Pulse 74   Temp 97.9 °F (36.6 °C)   Ht 5' 11\" (1.803 m)   Wt 221 lb (100.2 kg)   SpO2 97%   BMI 30.82 kg/m²     Assessment:      Diagnosis Orders   1. Upper respiratory tract infection, unspecified type         Plan:      Return if symptoms worsen or fail to improve, for Resume all previous medications as directed. No orders of the defined types were placed in this encounter.      · Practice meticulous handwashing and cover cough to prevent spread of infection  · Encouraged to increase fluids and rest  · Tylenol/Ibuprofen OTC PRN for pain, discomfort or fever as directed on package  · OTC cough medications for cough and congestion as directed on package  · Cool mist humidifier  · Hot tea with honey and lemon for cough PRN  · Patient instructions given for upper respiratory infection. · To ER or call 911 if any difficulty breathing, shortness of breath, inability to swallow, hives, rash, facial/tongue swelling or temp greater than 103 degrees. · Follow up with PCP or Walk in Care as needed if symptoms worsen or do not improve. Deanna Tamarajason received counseling on the following healthy behaviors: increased fluids and rest. Patient given educational materials - see patient instructions. Discussed use,benefit, and side effects of prescribed medications. Treatment plan discussed at visit. Continue routine health care follow up. All patient questions answered. Pt voiced understanding.       Electronically signed by CAM Handy CNP on 11/22/2021 at 8:50 PM

## 2021-11-22 NOTE — LETTER
[address] [place] [address]  Phone: [phone]  Fax: Wesley Zelaya, APRN - CNP        November 22, 2021     Patient: Vicki Werner   YOB: 1990   Date of Visit: 11/22/2021       To Whom It May Concern: It is my medical opinion that Joy Amaro may return to work on 11/23/2021. Please excuse for 11/22/2021,    If you have any questions or concerns, please don't hesitate to call.     Sincerely,        Shital Beltran, CAM - CNP

## 2022-04-29 ENCOUNTER — HOSPITAL ENCOUNTER (OUTPATIENT)
Dept: PHYSICAL THERAPY | Age: 32
Setting detail: THERAPIES SERIES
Discharge: HOME | End: 2022-04-29
Payer: MEDICAID

## 2022-04-29 PROCEDURE — 97162 PT EVAL MOD COMPLEX 30 MIN: CPT

## 2022-04-29 ASSESSMENT — PAIN SCALES - GENERAL: PAINLEVEL_OUTOF10: 2

## 2022-04-29 NOTE — PROGRESS NOTES
Phone: [phone]         Fax: [phone]                      Outpatient Physical Therapy                                                                      Evaluation    Date: 2022  Patient: Kacey Joseph  : 1990  ACCT #: [de-identified]    Referring Practitioner:Referring Provider (secondary): [facility]    Referral Date: 22      Diagnosis: Cervical Intervertibral disc degeneration    Treatment Diagnosis: Neck pain withradiculopathy  Onset Date: 22  PT Insurance Information: ALAYNA    Per Physician Order  Total # of Visits to Date: 1  No Show: 0  Canceled Appointment: 0     Subjective     Additional Pertinent Hx: Several year onset of progressive cervical pain and recent onset of radicular UE pain/numbness extending to hands. MRI  revealed DDD. Per patients mother he has also undergone EMG but no results available. Patient notes intermittent neck pain which is better in morning and increases with activity as well as intermittent bilateral UE numbness. Patient with mental disability/challenged but able to answer questions.      PMHx history includes migraines,  calf pain  Patient work 5 days /week part time at [employer]  Pain Assessment  Pain Level: 2     IADL History  Active : No  Occupation: Part time employment  Leisure & Hobbies: Enjoys movies, TV shows, Batman    Objective  Vision  Vision: Within Functional Limits  Hearing  Hearing: Within functional limits  Observation/Palpation  Posture: Fair  Palpation: Moderate tightness bilateral SCM left > right;  mild tightness with PA mobs  Spine  Cervical: Left rotation limitd 25% with mild radicular pain noted;  right rotation WFL;  FF normal without discomfort; extension with mild discomfort  Special Tests: C1-2 mobility normal without discomfort  Strength RUE  Strength RUE: WNL  Strength LUE  Strength LUE: WNL  Strength Other  Other:  strength 70-75# B    AROM RUE (degrees)  RUE AROM : WNL  AROM LUE (degrees)  LUE AROM : WNL                                           Assessment  Assessment: Patient presents with chronic neck pain which has progressively increased and now also reports bilateral UE numbness/pain. Patient is mentally challenged and although he is functional in regards to holding a part time job he is inconsistent in his symptoms. Plan to progress with cervical stretching and postural strengthening along with manual therapy.   May consider dry needling  Therapy Prognosis: Good    Clinical Presentation:  Evolving  The Following Comorbities will impact the patients progression and Plan of Care:   Migraines,  Previous ortho issues          Medium Complexity    Education: PT POC;  Issued doorway stretch Access Code [email or web address]        Learning  Does the patient/guardian have any barriers to learning?: Cognitive  Will there be a co-learner?: Yes  What is the preferred language of the co-learner?: English  How does the co-learner prefer to learn new concepts?: Reading,Pictures/Videos  What is the preferred language of the patient/guardian?: English  Is an  required?: No    Goals  Short Term Goals  Time Frame for Short term goals: 3 visits  Short term goal 1: Educate on home program of cervical stretches and postural strengthening  STG 1 Current Status[de-identified] No formal ex education prior to evaluation    Long Term Goals  Time Frame for Long term goals : 10 visits  Long term goal 1: Decrease subjective neck pain to < 2/10 with daily tasks  LTG 1 Current Status[de-identified] Neck pain range 0-6/10 with activity  Long term goal 2: Decrease subjective UE pain in frequency and/or severity to < 2/10  LTG 2 Current Status[de-identified] Intermittent hand/arm numbness    Patient's Goal:    Get rid of my neckpain    Timed Code Treatment Minutes: 0 Minutes  Total Treatment Time: 50     Time In: 1310  Time Out: 1597    [address] [facility], PT Date: 4/29/2022

## 2022-04-29 NOTE — PLAN OF CARE
[facility]       Phone: [phone]   Date: 2022                      Outpatient Physical Therapy  Fax: [phone]    ACCT #: [de-identified]                     Plan of Care  [facility]#: [id number]  Patient: Dennise Bernstein  : 1990    Referring Provider (secondary): [facility]    Referral Date: 22      Diagnosis: Cervical Intervertibral disc degeneration  Onset Date: 22  Treatment Diagnosis: Neck pain withradiculopathy      Assessment: Patient presents with chronic neck pain which has progressively increased and now also reports bilateral UE numbness/pain. Patient is mentally challenged and although he is functional in regards to holding a part time job he is inconsistent in his symptoms. Plan to progress with cervical stretching and postural strengthening along with manual therapy. May consider dry needling  Therapy Prognosis: Good    Treatment Plan :   Days: 2 (may be limited due to mother's schedule/transportation) times per week Weeks: 8 weeks      Patient Education/HEP, Therapeutic Exercise, Manual Therapy, Dry Needling and HP/CP     Goals:Time Frame for Short term goals: 3 visits  Short term goal 1: Educate on home program of cervical stretches and postural strengthening    Time Frame for Long term goals : 10 visits  Long term goal 1: Decrease subjective neck pain to < 2/10 with daily tasks  Long term goal 2: Decrease subjective UE pain in frequency and/or severity to < 2/10     MARK BACON PT   Date: 2022    ______________________________________ Date: 2022  Physician Signature  By signing above or cosigning electronically, I have reviewed this Plan of Care and certify a need for medically necessary rehabilitation services.

## 2022-05-05 ENCOUNTER — OFFICE VISIT (OUTPATIENT)
Dept: PRIMARY CARE CLINIC | Age: 32
End: 2022-05-05
Payer: MEDICAID

## 2022-05-05 ENCOUNTER — HOSPITAL ENCOUNTER (OUTPATIENT)
Dept: PREADMISSION TESTING | Age: 32
Setting detail: SPECIMEN
Discharge: HOME | End: 2022-05-05
Payer: MEDICAID

## 2022-05-05 VITALS
RESPIRATION RATE: 18 BRPM | SYSTOLIC BLOOD PRESSURE: 126 MMHG | OXYGEN SATURATION: 98 % | DIASTOLIC BLOOD PRESSURE: 89 MMHG | HEIGHT: 71 IN | BODY MASS INDEX: 30.94 KG/M2 | TEMPERATURE: 98.3 F | WEIGHT: 221 LBS | HEART RATE: 87 BPM

## 2022-05-05 DIAGNOSIS — J06.9 VIRAL URI WITH COUGH: Primary | ICD-10-CM

## 2022-05-05 DIAGNOSIS — R09.89 CHEST CONGESTION: ICD-10-CM

## 2022-05-05 DIAGNOSIS — R05.9 COUGH: ICD-10-CM

## 2022-05-05 DIAGNOSIS — R06.7 SNEEZING: ICD-10-CM

## 2022-05-05 LAB
FLU A ANTIGEN: NEGATIVE
FLU B ANTIGEN: NEGATIVE
SARS-COV-2, RAPID: NOT DETECTED
SPECIMEN DESCRIPTION: NORMAL

## 2022-05-05 PROCEDURE — 99202 OFFICE O/P NEW SF 15 MIN: CPT | Performed by: NURSE PRACTITIONER

## 2022-05-05 PROCEDURE — 87804 INFLUENZA ASSAY W/OPTIC: CPT

## 2022-05-05 PROCEDURE — 87635 SARS-COV-2 COVID-19 AMP PRB: CPT

## 2022-05-05 PROCEDURE — C9803 HOPD COVID-19 SPEC COLLECT: HCPCS

## 2022-05-05 NOTE — PROGRESS NOTES
Chief Complaint   Congestion (Started Monday-Congestion and cough, sore throat, sneezing. Home covid test last night was negative)      History of Present Illness   Source of history provided by: patient. Nara Cross is a 33 y.o. old male who has a past medical history of:   Past Medical History:   Diagnosis Date    Anxiety     Autistic disorder     Cerebral palsy (HCC)     Obsessive compulsive disorder     Presents to the clinic for evaluation of 4 days of cough, chest congestion, sore throat and sneezing. According to mother she feels that sore throat is only due to the cough. Denies fever, shortness of breath, CP, dyspnea, LE edema, abdominal pain, vomiting, rash, or lethargy. ROS   Pertinent positives and negatives are stated within HPI, all other systems reviewed and are negative. Surgical History:  has a past surgical history that includes skin biopsy. Social History:  reports that he has never smoked. He has never used smokeless tobacco. He reports that he does not drink alcohol. Family History: family history is not on file. Allergies: Latex    Physical Exam    VS:  /89 (Site: Left Upper Arm, Position: Sitting, Cuff Size: Medium Adult)   Pulse 87   Temp 98.3 °F (36.8 °C) (Oral)   Resp 18   Ht 5' 11\" (1.803 m)   Wt 221 lb (100.2 kg)   SpO2 98%   BMI 30.82 kg/m²      Constitutional:  Alert, development consistent with age. NAD. HEENT:     Head: Normocephalic. NC/NT,  No maxillary or frontal sinus tenderness on palpation. Eyes: Pupils equal, round, and reactive to light and accommodation. Extraocular movements intact. No conjunctival discharge. Sclera white. Ears: TMs pearly grey, no perforations, light reflex sharp, non-bulging. Canals clear, non-erythematous, no lesions. Nose: Nares patent, no lesions. Turbinates pink/red and non-edematous. Oral Cavity: Mucosa moist, pink, and smooth. Tonsils 1+ with no erythema or exudate.  Oropharynx pink, no drainage. Neck:  Normal ROM. Supple. No anterior cervical adenopathy noted. Lungs: CTAB without wheezes, rales, or rhonchi. CV:  Regular rate and rhythm, normal heart sounds, without pathological murmurs, ectopy, gallops, or rubs. Skin:  Normal turgor. Warm, dry, without visible rash. Lymphatic: No lymphangitis or adenopathy noted. Neurological:  Oriented. Motor functions intact. Lab / Imaging Results   (All laboratory and radiology results have been personally reviewed by myself)  Labs:  No results found for this visit on 05/05/22. Imaging: All Radiology results interpreted by Radiologist unless otherwise noted. No results found. Medical Decision Making   Pt non-toxic, in no apparent distress and stable at time of discharge. Assessment/Plan   Crispin Alvarenga was seen today for congestion. Diagnoses and all orders for this visit:    Viral URI with cough    Cough  -     Cancel: COVID-19; Future  -     Rapid Influenza A/B Antigens; Future    Sneezing  -     Cancel: COVID-19; Future  -     Rapid Influenza A/B Antigens; Future    Chest congestion  -     Cancel: COVID-19; Future  -     Rapid Influenza A/B Antigens; Future      - Crispin Alvarenga appears well, hydrated and with clear lung sounds without distress. Signs are stable and he is afebrile on exam today.  -Discussed with Crispin Alvarenga and mother concern for COVID-19 as community numbers have increased, will send for outpatient testing including influenza; this office will call once results are received. -  Symptomatic relief discussed including: Acetaminophen and/or ibuprofen as labeled for age/weight as needed for fever/pain. Good oral hydration and rest.    - F/U with PCP if symptoms persist. ED sooner if symptoms worsen or change. **This report was transcribed using voice recognition software. Every effort was made to ensure accuracy; however, inadvertent computerized transcription errors may be present.

## 2022-05-05 NOTE — LETTER
[facility] In  [facility] [address]  Phone: Bk Parker [phone], APRN - CNP      5/5/2022     Patient: Briseyda Marie   YOB: 1990       To Whom It May Concern: It is my medical opinion that Briseyda Marie should remain out of school/work while acutely ill and awaiting COVID-19 test results. Return to school/work with no retesting should be followed if test is negative AND meets these criteria as outlined by CDC/ODH:     a. No fever without the use of fever reducers for 24 hours  b. Improvement in symptoms     If tests positive for COVID-19, needs minimum of  days strict quarantine, improvement of symptoms and 24 hours fever free without fever reducing medications. If you have any questions or concerns, please don't hesitate to call.     Sincerely,          Eliel Land, APRN - CNP

## 2022-05-05 NOTE — PATIENT INSTRUCTIONS
Patient Education        Viral Respiratory Infection: Care Instructions  Your Care Instructions     Viruses are very small organisms. They grow in number after they enter your body. There are many types that cause different illnesses, such as colds andthe mumps. The symptoms of a viral respiratory infection often start quickly. They include a fever, sore throat, and runny nose. You may also just not feel well. Or youmay not want to eat much. Most viral respiratory infections are not serious. They usually get better withtime and self-care. Antibiotics are not used to treat a viral infection. That's because antibiotics will not help cure a viral illness. In some cases, antiviral medicine can helpyour body fight a serious viral infection. Follow-up care is a key part of your treatment and safety. Be sure to make and go to all appointments, and call your doctor if you are having problems. It's also a good idea to know your test results and keep alist of the medicines you take. How can you care for yourself at home?  Rest as much as possible until you feel better.  Be safe with medicines. Take your medicine exactly as prescribed. Call your doctor if you think you are having a problem with your medicine. You will get more details on the specific medicine your doctor prescribes.  Take an over-the-counter pain medicine, such as acetaminophen (Tylenol), ibuprofen (Advil, Motrin), or naproxen (Aleve), as needed for pain and fever. Read and follow all instructions on the label. Do not give aspirin to anyone younger than 20. It has been linked to Reye syndrome, a serious illness.  Drink plenty of fluids. Hot fluids, such as tea or soup, may help relieve congestion in your nose and throat. If you have kidney, heart, or liver disease and have to limit fluids, talk with your doctor before you increase the amount of fluids you drink.  Try to clear mucus from your lungs by breathing deeply and coughing.    Gargle with warm salt water once an hour. This can help reduce swelling and throat pain. Use 1 teaspoon of salt mixed in 1 cup of warm water.  Do not smoke or allow others to smoke around you. If you need help quitting, talk to your doctor about stop-smoking programs and medicines. These can increase your chances of quitting for good. To avoid spreading the virus   Cough or sneeze into a tissue. Then throw the tissue away.  If you don't have a tissue, use your hand to cover your cough or sneeze. Then clean your hand. You can also cough into your sleeve.  Wash your hands often. Use soap and warm water. Wash for 15 to 20 seconds each time.  If you don't have soap and water near you, you can clean your hands with alcohol wipes or gel. When should you call for help? Call your doctor now or seek immediate medical care if:     You have a new or higher fever.      Your fever lasts more than 48 hours.      You have trouble breathing.      You have a fever with a stiff neck or a severe headache.      You are sensitive to light.      You feel very sleepy or confused. Watch closely for changes in your health, and be sure to contact your doctor if:     You do not get better as expected. Where can you learn more? Go to https://Check[employer]. org and sign in to your [employer] account. Enter S645 in the [facility] box to learn more about \"Viral Respiratory Infection: Care Instructions. \"     If you do not have an account, please click on the \"Sign Up Now\" link. Current as of: July 6, 2021               Content Version: 13.2  © 2006-2022 [employer] Inc[employer]. Care instructions adapted under license by [facility] ([facility]). If you have questions about a medical condition or this instruction, always ask your healthcare professional. [place] [address] any warranty or liability for your use of this information.

## 2022-05-11 ENCOUNTER — HOSPITAL ENCOUNTER (OUTPATIENT)
Dept: PHYSICAL THERAPY | Age: 32
Setting detail: THERAPIES SERIES
Discharge: HOME | End: 2022-05-11
Payer: MEDICAID

## 2022-05-11 PROCEDURE — 97110 THERAPEUTIC EXERCISES: CPT

## 2022-05-11 PROCEDURE — 97140 MANUAL THERAPY 1/> REGIONS: CPT

## 2022-05-11 NOTE — PROGRESS NOTES
Phone: Francisco J Alvarez      Fax: [phone]                            Outpatient Physical Therapy                                                                            Daily Note    Date: 2022  Patient Name: Nolberto Anderson        MRN: [id number]   ACCT#:  [de-identified]  : 1990  (33 y.o.)    Referring Provider (secondary): [facility]         Diagnosis: Cervical Intervertibral disc degeneration  Treatment Diagnosis: Neck pain withradiculopathy    Onset Date: 22  PT Insurance Information: ALAYNA    Per Physician Order  Total # of Visits to Date: 2  No Show: 0  Canceled Appointment: 0  Plan of Care/Certification Expiration Date: 22    Pre-Treatment Pain:  Not assigned     Assessment  Assessment: Diffucult to ascertain from patient if symptoms decreased or remained same. Reviewed doorway stretch with verbal cue on proper posture and foot placment. Issued written HEP for green tband  postural strengthening of shld ext/scap retraction and shld  0 deg rows. Tightness noted of lateral cervical musc with manual therapy. Discusse HEP with patients mother as well as plan to continue with present treatment for 1 additional session and then consider dry needling if no improvement.     Plan  Continue with current plan of care    Exercises/Modalities/Manual:  See DocFlow Sheet    Education: Issued written HEP for shld ext/scap retraction tband and tband 0 deg rows and discussed with mother who is co-learner          Goals  (Total # of Visits to Date: 2)   Short Term Goals - Time Frame for Short term goals: 3 visits  Short Term Goals  Time Frame for Short term goals: 3 visits  Short term goal 1: Educate on home program of cervical stretches and postural strengthening  STG 1 Current Status[de-identified] No formal ex education prior to evaluation    Long Term Goals - Time Frame for Long term goals : 10 visits  Long Term Goals  Time Frame for Long term goals : 10 visits  Long term goal 1: Decrease subjective neck pain to < 2/10 with daily tasks  LTG 1 Current Status[de-identified] Neck pain range 0-6/10 with activity  Long term goal 2: Decrease subjective UE pain in frequency and/or severity to < 2/10  LTG 2 Current Status[de-identified] Intermittent hand/arm numbness    Post Treatment Pain:  Not assigned  Time In: 0900    Time Out : 0930        Timed Code Treatment Minutes: 30 Minutes  Total Treatment Time: 30 Minutes    ABBY REY, PT     Date: 5/11/2022

## 2022-05-16 ENCOUNTER — HOSPITAL ENCOUNTER (OUTPATIENT)
Dept: PREADMISSION TESTING | Age: 32
Setting detail: SPECIMEN
Discharge: HOME | End: 2022-05-16
Payer: MEDICAID

## 2022-05-16 LAB
SARS-COV-2, RAPID: NOT DETECTED
SPECIMEN DESCRIPTION: NORMAL

## 2022-05-16 PROCEDURE — C9803 HOPD COVID-19 SPEC COLLECT: HCPCS

## 2022-05-16 PROCEDURE — U0005 INFEC AGEN DETEC AMPLI PROBE: HCPCS

## 2022-05-16 PROCEDURE — U0003 INFECTIOUS AGENT DETECTION BY NUCLEIC ACID (DNA OR RNA); SEVERE ACUTE RESPIRATORY SYNDROME CORONAVIRUS 2 (SARS-COV-2) (CORONAVIRUS DISEASE [COVID-19]), AMPLIFIED PROBE TECHNIQUE, MAKING USE OF HIGH THROUGHPUT TECHNOLOGIES AS DESCRIBED BY CMS-2020-01-R: HCPCS

## 2022-05-16 PROCEDURE — 87635 SARS-COV-2 COVID-19 AMP PRB: CPT

## 2022-05-17 LAB
SARS-COV-2: ABNORMAL
SARS-COV-2: DETECTED
SOURCE: ABNORMAL

## 2022-05-18 ENCOUNTER — HOSPITAL ENCOUNTER (OUTPATIENT)
Dept: PHYSICAL THERAPY | Age: 32
Setting detail: THERAPIES SERIES
Discharge: HOME | End: 2022-05-18
Payer: MEDICAID

## 2022-05-18 NOTE — PROGRESS NOTES
[facility]  Rehab and Wellness    Date: 2022  Patient Name: Clay Cole        : 1990       Pt Cancelled Appt due to Illness      Molly Moreno Date: 2022

## 2022-05-23 ENCOUNTER — HOSPITAL ENCOUNTER (OUTPATIENT)
Dept: PHYSICAL THERAPY | Age: 32
Setting detail: THERAPIES SERIES
Discharge: HOME | End: 2022-05-23
Payer: MEDICAID

## 2022-05-23 PROCEDURE — 97110 THERAPEUTIC EXERCISES: CPT

## 2022-05-23 PROCEDURE — 97140 MANUAL THERAPY 1/> REGIONS: CPT

## 2022-05-23 ASSESSMENT — PAIN SCALES - GENERAL: PAINLEVEL_OUTOF10: 1

## 2022-05-23 NOTE — PROGRESS NOTES
Phone: Francisco J Alvarez      Fax: [phone]                            Outpatient Physical Therapy                                                                            Daily Note    Date: 2022  Patient Name: Zina Smith        MRN: [id number]   ACCT#:  [de-identified]  : 1990  (33 y.o.)    Referring Provider (secondary): [facility]         Diagnosis: Cervical Intervertibral disc degeneration  Treatment Diagnosis: Neck pain withradiculopathy    Onset Date: 22  PT Insurance Information: ALAYNA    Per Physician Order  Total # of Visits to Date: 3  No Show: 0  Canceled Appointment: 0  Plan of Care/Certification Expiration Date: 22    Pre-Treatment Pain:  1/10     Assessment  Assessment: Patient reports neck pain is a 0/10 this morning and reports no finger tingling this morning. Completed postural strengthening ex, but patient required quite a bit of verbal cueing for proper form. Concluded session with manual as outlined. Following session patient denies pain and tingling. Placed phone call to patient's mother re: schedule for next week. Also spoke about dry needling. Told patient's mother if patient's pain remains low will hold off on dry needling as we don't know what insurance will cover and patient's mom is agreeable to this.     Plan  Continue with current plan of care    Exercises/Modalities/Manual:  See DocFlow Sheet    Education:     Goals  (Total # of Visits to Date: 3)   Short Term Goals  Time Frame for Short term goals: 3 visits  Short term goal 1: Educate on home program of cervical stretches and postural strengthening - MET  STG Goal 1 Status[de-identified] Met    Long Term Goals  Time Frame for Long term goals : 10 visits  Long term goal 1: Decrease subjective neck pain to < 2/10 with daily tasks  LTG 1 Current Status[de-identified] Neck pain range 0-6/10 with activity  Long term goal 2: Decrease subjective UE pain in frequency and/or severity to < 2/10  LTG Quick Note:    Pt has significant cervical and lumbar djd, multilevel, worse in both areas since last exams  pls schedule for neuro PT at [facility] only  If sx not better, can consider [facility] after that    ______ 2 Current Status[de-identified] Intermittent hand/arm numbness    Post Treatment Pain:  0/10    Time In: 1120    Time Out : 1155   Timed Code Treatment Minutes: 35 Minutes  Total Treatment Time: 28 Minutes    Kaye Rosen [place]     Date: 5/23/2022

## 2022-05-25 ENCOUNTER — HOSPITAL ENCOUNTER (OUTPATIENT)
Dept: PHYSICAL THERAPY | Age: 32
Setting detail: THERAPIES SERIES
End: 2022-05-25
Payer: MEDICAID

## 2022-05-26 ENCOUNTER — APPOINTMENT (OUTPATIENT)
Dept: PHYSICAL THERAPY | Age: 32
End: 2022-05-26
Payer: MEDICAID

## 2022-06-01 ENCOUNTER — APPOINTMENT (OUTPATIENT)
Dept: PHYSICAL THERAPY | Age: 32
End: 2022-06-01
Payer: MEDICAID

## 2022-06-15 ENCOUNTER — HOSPITAL ENCOUNTER (OUTPATIENT)
Dept: PHYSICAL THERAPY | Age: 32
Setting detail: THERAPIES SERIES
Discharge: HOME | End: 2022-06-15
Payer: MEDICAID

## 2022-06-15 PROCEDURE — 97110 THERAPEUTIC EXERCISES: CPT

## 2022-06-15 PROCEDURE — 97140 MANUAL THERAPY 1/> REGIONS: CPT

## 2022-06-15 ASSESSMENT — PAIN SCALES - GENERAL: PAINLEVEL_OUTOF10: 1

## 2022-06-15 NOTE — PROGRESS NOTES
Phone: [address]           Fax: [phone]                            Outpatient Physical Therapy                                                                            Daily Note    Date: 6/15/2022  Patient Name: Nara Cross        MRN: [id number]   ACCT#:  [de-identified]  : 1990  (33 y.o.)    Referring Provider (secondary): [facility]         Diagnosis: Cervical Intervertibral disc degeneration  Treatment Diagnosis: Neck pain withradiculopathy    Onset Date: 22  PT Insurance Information: ALAYNA    Per Physician Order  Total # of Visits to Date: 4  No Show: 0  Canceled Appointment: 0  Plan of Care/Certification Expiration Date: 22    Pre-Treatment Pain:  1-2/10     Assessment  Assessment: Patient states pain is 1-2/10 this afternoon. Completed postural strengthening exercises per flow sheet. Patient required verbal and tactile cueing for correct posture. Following manual, patient states pain is 1-2/10 with no tingling or pain down arm. Patient states tingling/pn down arm is most relative to sleeping. Discussed D/C with patient per his request as he feels he is doing 90% better. Plan to continue x1 visit and D/C with HEP.     Plan  Continue with current plan of care    Exercises/Modalities/Manual:  See DocFlow Sheet    Education:      Goals  (Total # of Visits to Date: 4)   Short Term Goals - Time Frame for Short term goals: 3 visits  Short Term Goals  Time Frame for Short term goals: 3 visits  Short term goal 1: Educate on home program of cervical stretches and postural strengthening - MET  STG Goal 1 Status[de-identified] Met    Long Term Goals - Time Frame for Long term goals : 10 visits  Long Term Goals  Time Frame for Long term goals : 10 visits  Long term goal 1: Decrease subjective neck pain to < 2/10 with daily tasks  LTG 1 Current Status[de-identified] Neck pain range 0-6/10 with activity  Long term goal 2: Decrease subjective UE pain in frequency and/or severity to < 2/10  LTG 2 Current Status[de-identified] Intermittent hand/arm numbness    Post Treatment Pain:  1-2/10    Time In: 1302    Time Out : 1340  Timed Code Treatment Minutes: 35 Minutes  Total Treatment Time: 135 [address] CANDE Garcia /Directly Supervised by Brandon Benjamin, LISA     Date: 6/15/2022

## 2022-06-22 ENCOUNTER — HOSPITAL ENCOUNTER (OUTPATIENT)
Dept: PHYSICAL THERAPY | Age: 32
Setting detail: THERAPIES SERIES
Discharge: HOME | End: 2022-06-22
Payer: MEDICAID

## 2022-06-22 PROCEDURE — 97140 MANUAL THERAPY 1/> REGIONS: CPT

## 2022-06-22 PROCEDURE — 97110 THERAPEUTIC EXERCISES: CPT

## 2022-06-22 ASSESSMENT — PAIN SCALES - GENERAL: PAINLEVEL_OUTOF10: 1

## 2022-06-22 NOTE — PROGRESS NOTES
Phone: Francisco J Alvarez           Fax: [phone]                            Outpatient Physical Therapy                                                                            Daily Note    Date: 2022  Patient Name: Laura Noel        MRN: [id number]   ACCT#:  [de-identified]  : 1990  (33 y.o.)    Referring Provider (secondary): [facility]         Diagnosis: Cervical Intervertibral disc degeneration  Treatment Diagnosis: Neck pain withradiculopathy    Onset Date: 22  PT Insurance Information: ALAYNA    Per Physician Order  Total # of Visits to Date: 5  No Show: 0  Canceled Appointment: 0  Plan of Care/Certification Expiration Date: 22    Pre-Treatment Pain:  1-2/10     Assessment  Assessment: Patient states pain is 1-2/10 in neck this afternoon. Continued therex and manual per flowsheet; requires verbal and tactile cueing for posture. Patient states he feels improvement overall is about a 70 percent with pain down the arm and in neck. Patient states pain is 1-2/10 following manual. Patient D/C this session.     Plan  Discharge    Exercises/Modalities/Manual:  See DocFlow Sheet    Education:     Goals  (Total # of Visits to Date: 5)   Short Term Goals - Time Frame for Short term goals: 3 visits  Short Term Goals  Time Frame for Short term goals: 3 visits  Short term goal 1: Educate on home program of cervical stretches and postural strengthening - MET  STG Goal 1 Status[de-identified] Met    Long Term Goals - Time Frame for Long term goals : 10 visits  Long Term Goals  Time Frame for Long term goals : 10 visits  Long term goal 1: Decrease subjective neck pain to < 2/10 with daily tasks - NOT MET  LTG 1 Current Status[de-identified] Neck pain range 0-6/10 with activity  LTG Goal 1 Status[de-identified] Not Met  Long term goal 2: Decrease subjective UE pain in frequency and/or severity to < 2/10 - NOT MET  LTG 2 Current Status[de-identified] Intermittent hand/arm numbness  LTG Goal 2 Status[de-identified] Not Met    Post Treatment Pain:  1-2/10    Time In: 1300    Time Out : 1341  Timed Code Treatment Minutes: 41 Minutes  Total Treatment Time: 1275 Artis Hollins, SPTA /Directly Supervised by Cash Curiel PTA     Date: 6/22/2022

## 2022-06-30 ENCOUNTER — HOSPITAL ENCOUNTER (OUTPATIENT)
Dept: PHYSICAL THERAPY | Age: 32
Setting detail: THERAPIES SERIES
End: 2022-06-30
Payer: MEDICAID

## 2022-06-30 NOTE — DISCHARGE SUMMARY
Phone: Francisco J Alvarez             Fax: [phone]                            Outpatient Physical Therapy                                                                    Discharge Summary    Patient: Oscar Houston  : 1990  ACCT #: [de-identified]   Referring Provider: Jana Ashraf      Diagnosis: Cervical Intervertibral disc degeneration    Date Treatment Initiated: 22  Date of Last Treatment: 22    PT Visit Information  Onset Date: 22  PT Insurance Information: [facility]  Total # of Visits to Date: 5  Plan of Care/Certification Expiration Date: 22  No Show: 0  Canceled Appointment: 0    Treatment Received:   Patient Education/HEP, Therapeutic Exercise and Manual Therapy    Assessment: Patient has completed 5 treatment sessions consisting of exercise/education on home program and manual therapy to address cervical and upper thoracic mobility. He initially presented with complaints of cervical pan and radicular bilateral UE pain/numbness. Overall he has progressed well and currently  rates his cervical pain is 1-2/10 and his UE radicular symptoms have improved  70 percent. He has been educated on a home program but due to his mental disability he requires verbal and tactile cues to perform correctly. His insurance does not cover the cost of dry needling which was ordered per patients mother. Based on his current improvement, will plan to discharge further PT     Reason for Discharge:   Goals Met and Optimal Function Achieved    Comments:   Thank you for this referral      Lonnie Clayton, PT  Date: 2022

## 2022-11-13 NOTE — PROGRESS NOTES
Pt wanting to leave. Pt signed the declination of services form.    improve flexibility of HS (<25 deg HS 90 stretch)  to improve gait pattern  Long term goal 2: Decrease subjective LE pain to <2/10 on Fishman-Baker scale with squatting or walking activities-             Post Treatment Pain:  3/10    Time In: 1105    Time Out : 1150        Timed Code Treatment Minutes: 45 Minutes  Total Treatment Time: Devon Chance 38, PT     Date: 5/13/2020

## 2022-11-16 ENCOUNTER — HOSPITAL ENCOUNTER (OUTPATIENT)
Age: 32
Setting detail: SPECIMEN
Discharge: HOME | End: 2022-11-16
Payer: MEDICAID

## 2022-11-16 ENCOUNTER — HOSPITAL ENCOUNTER (OUTPATIENT)
Dept: PREADMISSION TESTING | Age: 32
Setting detail: SPECIMEN
Discharge: HOME | End: 2022-11-16
Payer: MEDICAID

## 2022-11-16 ENCOUNTER — OFFICE VISIT (OUTPATIENT)
Dept: PRIMARY CARE CLINIC | Age: 32
End: 2022-11-16
Payer: MEDICARE

## 2022-11-16 VITALS
BODY MASS INDEX: 30.54 KG/M2 | HEART RATE: 88 BPM | SYSTOLIC BLOOD PRESSURE: 127 MMHG | TEMPERATURE: 98 F | WEIGHT: 219 LBS | RESPIRATION RATE: 16 BRPM | OXYGEN SATURATION: 97 % | DIASTOLIC BLOOD PRESSURE: 73 MMHG

## 2022-11-16 DIAGNOSIS — J06.9 VIRAL UPPER RESPIRATORY TRACT INFECTION: ICD-10-CM

## 2022-11-16 DIAGNOSIS — J02.9 SORE THROAT: ICD-10-CM

## 2022-11-16 DIAGNOSIS — J06.9 VIRAL UPPER RESPIRATORY TRACT INFECTION: Primary | ICD-10-CM

## 2022-11-16 PROBLEM — M54.2 CERVICALGIA: Status: ACTIVE | Noted: 2021-12-01

## 2022-11-16 PROBLEM — L73.9 FOLLICULITIS: Status: ACTIVE | Noted: 2022-11-16

## 2022-11-16 PROBLEM — B07.9 VERRUCA: Status: ACTIVE | Noted: 2022-06-10

## 2022-11-16 PROBLEM — M50.21: Status: ACTIVE | Noted: 2022-03-23

## 2022-11-16 PROBLEM — L73.9 DISORDER OF HAIR FOLLICLE: Status: ACTIVE | Noted: 2022-06-10

## 2022-11-16 PROBLEM — M50.30 OTHER CERVICAL DISC DEGENERATION, UNSPECIFIED CERVICAL REGION: Status: ACTIVE | Noted: 2021-12-01

## 2022-11-16 LAB
FLU A ANTIGEN: NEGATIVE
FLU B ANTIGEN: NEGATIVE
S PYO AG THROAT QL: NORMAL
SARS-COV-2, RAPID: NOT DETECTED
SPECIMEN DESCRIPTION: NORMAL

## 2022-11-16 PROCEDURE — 87880 STREP A ASSAY W/OPTIC: CPT | Performed by: NURSE PRACTITIONER

## 2022-11-16 PROCEDURE — 99213 OFFICE O/P EST LOW 20 MIN: CPT | Performed by: NURSE PRACTITIONER

## 2022-11-16 PROCEDURE — C9803 HOPD COVID-19 SPEC COLLECT: HCPCS

## 2022-11-16 PROCEDURE — 87635 SARS-COV-2 COVID-19 AMP PRB: CPT

## 2022-11-16 PROCEDURE — 87651 STREP A DNA AMP PROBE: CPT

## 2022-11-16 PROCEDURE — 87804 INFLUENZA ASSAY W/OPTIC: CPT

## 2022-11-16 ASSESSMENT — ENCOUNTER SYMPTOMS
VOMITING: 0
SINUS PAIN: 0
DIARRHEA: 0
SHORTNESS OF BREATH: 0
SORE THROAT: 1
COUGH: 1
WHEEZING: 0
NAUSEA: 0
RHINORRHEA: 1

## 2022-11-16 NOTE — LETTER
[facility] [address]  Phone: [phone]  Fax: Wesley Zelaya, APRN - CNP        November 17, 2022     Patient: Ezra Smith   YOB: 1990   Date of Visit: 11/16/2022       To Whom it May Concern: Dalia Isaac was seen in my clinic on 11/16/2022. He may return to work on 11/21/2022. If you have any questions or concerns, please don't hesitate to call.     Sincerely,         [facility], APRN - CNP

## 2022-11-16 NOTE — PROGRESS NOTES
[facility]  [address] [address]  Dept: [phone]  Dept Fax: [phone]    Anna Christianson is a 27 y.o. male who presents to the [facility] today for hismedical conditions/complaints as noted below. Anna Christianson is c/o of URI (Sore throat, congestion and fever. Symptoms started yesterday)      HPI:     URI   This is a new problem. The current episode started yesterday (Mother reports started yesterday with sore throat, congestion and tactile fever, \"really hot\" at 2 AM.  Arms and legs aching since Monday. Denies known exposure to Covid-19 or influenza. Home test yesterday and was negative. ). The problem has been gradually worsening. Maximum temperature: Tactile fever. Associated symptoms include congestion, coughing, ear pain, headaches, rhinorrhea and a sore throat. Pertinent negatives include no diarrhea, nausea, rash, sinus pain, vomiting or wheezing. Associated symptoms comments: Fever and body aches. . He has tried NSAIDs (Ibuprofen at 2 AM) for the symptoms. The treatment provided moderate relief. Past Medical History:   Diagnosis Date    Anxiety     Autistic disorder     Cerebral palsy (HCC)     Obsessive compulsive disorder         Current Outpatient Medications   Medication Sig Dispense Refill    topiramate (TOPAMAX) 25 MG tablet Take 25 mg by mouth 2 times daily      Melatonin 10 MG TABS Take 10 mg by mouth nightly      PARoxetine (PAXIL) 40 MG tablet Take 40 mg by mouth every morning.  ibuprofen (ADVIL;MOTRIN) 400 MG tablet Take 400 mg by mouth every 6 hours as needed      famotidine (PEPCID) 20 MG tablet Take 1 tablet by mouth 2 times daily for 7 days 14 tablet 0     No current facility-administered medications for this visit. Allergies   Allergen Reactions    Latex Dermatitis       :     Review of Systems   Constitutional:  Positive for fever.  Negative for appetite change, chills, diaphoresis and fatigue. HENT:  Positive for congestion, ear pain, rhinorrhea and sore throat. Negative for sinus pain. Respiratory:  Positive for cough. Negative for shortness of breath and wheezing. Gastrointestinal:  Negative for diarrhea, nausea and vomiting. Musculoskeletal:  Positive for myalgias. Skin:  Negative for rash and wound. Neurological:  Positive for dizziness (Early this AM.) and headaches.     :     Physical Exam  Vitals and nursing note reviewed. Constitutional:       General: He is not in acute distress. Appearance: Normal appearance. He is well-developed. He is not ill-appearing or diaphoretic. Comments: Arrives ambulatory with mother. Well hydrated, nontoxic appearance. HENT:      Head: Normocephalic and atraumatic. Right Ear: Hearing, ear canal and external ear normal. A middle ear effusion (Pale white fluid.) is present. No mastoid tenderness. Tympanic membrane is not injected, erythematous or bulging. Left Ear: Hearing, ear canal and external ear normal. A middle ear effusion (Pale white fluid.) is present. No mastoid tenderness. Tympanic membrane is not injected, erythematous or bulging. Nose: Mucosal edema, congestion and rhinorrhea present. Rhinorrhea is clear. Right Sinus: No maxillary sinus tenderness or frontal sinus tenderness. Left Sinus: No maxillary sinus tenderness or frontal sinus tenderness. Mouth/Throat:      Lips: Pink. Mouth: Mucous membranes are moist.      Pharynx: Uvula midline. Pharyngeal swelling and posterior oropharyngeal erythema (Slight erythema to posterior pharynx. Leona Susan ) present. No oropharyngeal exudate or uvula swelling. Tonsils: 1+ on the right. 1+ on the left. Eyes:      General:         Right eye: No discharge. Left eye: No discharge. Conjunctiva/sclera: Conjunctivae normal.      Pupils: Pupils are equal, round, and reactive to light.    Cardiovascular: Rate and Rhythm: Normal rate and regular rhythm. Heart sounds: Normal heart sounds, S1 normal and S2 normal. No murmur heard. No friction rub. No gallop. Pulmonary:      Effort: Pulmonary effort is normal. No accessory muscle usage or respiratory distress. Breath sounds: Normal breath sounds and air entry. No decreased breath sounds, wheezing, rhonchi or rales. Comments: Rare dry cough. Breath sounds clear B/L anterior and posterior lobes. Chest expansion symmetrical.  No audible wheezing or respiratory distress. No rales or rhonchi. Abdominal:      General: Bowel sounds are normal.      Palpations: Abdomen is soft. Tenderness: There is no abdominal tenderness. Musculoskeletal:         General: Normal range of motion. Lymphadenopathy:      Cervical: No cervical adenopathy. Right cervical: No superficial or posterior cervical adenopathy. Left cervical: No superficial or posterior cervical adenopathy. Skin:     General: Skin is warm and dry. Coloration: Skin is not pale. Findings: No erythema or rash. Neurological:      Mental Status: He is alert and oriented to person, place, and time. Psychiatric:         Behavior: Behavior normal. Behavior is cooperative. /73   Pulse 88   Temp 98 °F (36.7 °C) (Oral)   Resp 16   Wt 219 lb (99.3 kg)   SpO2 97%   BMI 30.54 kg/m²     Results for orders placed or performed in visit on 11/16/22   POCT rapid strep A   Result Value Ref Range    Strep A Ag None Detected None Detected    Centor Score:  +1,  will send strep culture.    :      Diagnosis Orders   1. Viral upper respiratory tract infection  Rapid Influenza A/B Antigens      2. Sore throat  POCT rapid strep A    Strep A DNA probe, amplification          :      Return if symptoms worsen or fail to improve, for Resume all previous medications as directed. No orders of the defined types were placed in this encounter.      Practice meticulous handwashing and cover cough to prevent spread of infection. Influenza and COVID-19 - will call with results. Strep culture - will call results. Do not return to work until symptoms have improved and no fever for 24 hrs without fever reducing medication. Encouraged to increase fluids and rest.  Encouraged to take Vitamin C 500 to 1000 mg daily, Zinc 50 mg daily, Vitamin D3 2,000 IU daily and Vitamin B complex daily or a multivitamin daily. Tylenol OTC PRN for pain, discomfort or fever as directed on package  Cool mist humidifier  Hot tea with honey and lemon for cough PRN  Patient instructions given for Flu symptoms and Covid 19 infection. .  To ER or call 911 if any increasing difficulty breathing, shortness of breath, inability to swallow, hives, rash, facial/tongue swelling or temp greater than 103 degrees. Follow up with PCP or Walk in Care as needed if symptoms worsen or do not improve. Lisaoleg Aquino received counseling on the following healthy behaviors: increased fluids and rest.  Patient given educational materials - see patient instructions. Discussed use, benefit, and side effects of prescribed medications. Treatment plan discussed at visit. Continue routine health care follow up. All patient questions answered. Pt voiced understanding.       Electronically signed by CAM Ann CNP on 11/17/2022 at 11:59 AM

## 2022-11-17 LAB
DIRECT EXAM: NORMAL
SPECIMEN DESCRIPTION: NORMAL

## 2023-04-04 ENCOUNTER — HOSPITAL ENCOUNTER (EMERGENCY)
Age: 33
Discharge: HOME | End: 2023-04-04
Attending: EMERGENCY MEDICINE
Payer: MEDICARE

## 2023-04-04 VITALS
DIASTOLIC BLOOD PRESSURE: 90 MMHG | SYSTOLIC BLOOD PRESSURE: 139 MMHG | WEIGHT: 221 LBS | BODY MASS INDEX: 31.64 KG/M2 | OXYGEN SATURATION: 98 % | RESPIRATION RATE: 16 BRPM | HEART RATE: 81 BPM | TEMPERATURE: 98.3 F | HEIGHT: 70 IN

## 2023-04-04 DIAGNOSIS — S61.412A LACERATION OF LEFT HAND WITHOUT FOREIGN BODY, INITIAL ENCOUNTER: Primary | ICD-10-CM

## 2023-04-04 PROCEDURE — 6370000000 HC RX 637 (ALT 250 FOR IP): Performed by: EMERGENCY MEDICINE

## 2023-04-04 PROCEDURE — 2500000003 HC RX 250 WO HCPCS: Performed by: EMERGENCY MEDICINE

## 2023-04-04 PROCEDURE — 12001 RPR S/N/AX/GEN/TRNK 2.5CM/<: CPT

## 2023-04-04 PROCEDURE — 6360000002 HC RX W HCPCS: Performed by: EMERGENCY MEDICINE

## 2023-04-04 PROCEDURE — 90471 IMMUNIZATION ADMIN: CPT | Performed by: EMERGENCY MEDICINE

## 2023-04-04 PROCEDURE — 99284 EMERGENCY DEPT VISIT MOD MDM: CPT

## 2023-04-04 PROCEDURE — 90715 TDAP VACCINE 7 YRS/> IM: CPT | Performed by: EMERGENCY MEDICINE

## 2023-04-04 RX ORDER — IBUPROFEN 200 MG
600 TABLET ORAL ONCE
Status: COMPLETED | OUTPATIENT
Start: 2023-04-04 | End: 2023-04-04

## 2023-04-04 RX ORDER — LIDOCAINE HYDROCHLORIDE 10 MG/ML
5 INJECTION, SOLUTION INFILTRATION; PERINEURAL ONCE
Status: COMPLETED | OUTPATIENT
Start: 2023-04-04 | End: 2023-04-04

## 2023-04-04 RX ADMIN — LIDOCAINE HYDROCHLORIDE 5 ML: 10 INJECTION, SOLUTION INFILTRATION; PERINEURAL at 06:22

## 2023-04-04 RX ADMIN — TETANUS TOXOID, REDUCED DIPHTHERIA TOXOID AND ACELLULAR PERTUSSIS VACCINE, ADSORBED 0.5 ML: 5; 2.5; 8; 8; 2.5 SUSPENSION INTRAMUSCULAR at 06:23

## 2023-04-04 RX ADMIN — Medication 3 ML: at 06:22

## 2023-04-04 RX ADMIN — IBUPROFEN 600 MG: 200 TABLET, FILM COATED ORAL at 06:20

## 2023-04-04 ASSESSMENT — PAIN - FUNCTIONAL ASSESSMENT: PAIN_FUNCTIONAL_ASSESSMENT: 0-10

## 2023-04-04 ASSESSMENT — PAIN DESCRIPTION - ORIENTATION: ORIENTATION: LEFT

## 2023-04-04 ASSESSMENT — PAIN SCALES - GENERAL
PAINLEVEL_OUTOF10: 8
PAINLEVEL_OUTOF10: 6

## 2023-04-04 ASSESSMENT — LIFESTYLE VARIABLES
HOW OFTEN DO YOU HAVE A DRINK CONTAINING ALCOHOL: NEVER
HOW MANY STANDARD DRINKS CONTAINING ALCOHOL DO YOU HAVE ON A TYPICAL DAY: PATIENT DOES NOT DRINK

## 2023-04-04 ASSESSMENT — PAIN DESCRIPTION - DESCRIPTORS: DESCRIPTORS: BURNING

## 2023-04-04 ASSESSMENT — PAIN DESCRIPTION - PAIN TYPE: TYPE: ACUTE PAIN

## 2023-04-04 ASSESSMENT — PAIN DESCRIPTION - LOCATION: LOCATION: HAND

## 2023-04-04 ASSESSMENT — PAIN DESCRIPTION - FREQUENCY: FREQUENCY: CONTINUOUS

## 2023-04-04 NOTE — Clinical Note
Alin Quinones was seen and treated in our emergency department on 4/4/2023. He may return to work on 04/06/2023. Please keep the right hand clean and dry for 7 days      If you have any questions or concerns, please don't hesitate to call.       Neda Contreras MD

## 2023-04-04 NOTE — ED PROVIDER NOTES
for further evaluation and management and to come back to the ER in case of any new symptoms    ED Medications administered this visit:    Medications   ibuprofen (ADVIL;MOTRIN) tablet 600 mg (600 mg Oral Given 4/4/23 0620)   lidocaine-EPINEPHrine-tetracaine (LET) topical solution 3 mL syringe (3 mLs Topical Given 4/4/23 0622)   lidocaine 1 % injection 5 mL (5 mLs IntraDERmal Given 4/4/23 0622)   Tetanus-Diphth-Acell Pertussis (BOOSTRIX) injection 0.5 mL (0.5 mLs IntraMUSCular Given 4/4/23 2161)       New Prescriptions from this visit:    New Prescriptions    No medications on file       Follow-up:  ALEJANDRO Whitfield  [phone]    Schedule an appointment as soon as possible for a visit in 1 week          Final Impression:   1.  Laceration of left hand without foreign body, initial encounter New Problem                                                 Claudia Herrera MD  04/04/23 [id number]

## 2024-06-10 ENCOUNTER — TELEPHONE (OUTPATIENT)
Dept: PREADMISSION TESTING | Age: 34
End: 2024-06-10

## 2024-06-10 RX ORDER — CETIRIZINE HYDROCHLORIDE 5 MG/1
5 TABLET ORAL DAILY
COMMUNITY

## 2024-06-10 RX ORDER — LOSARTAN POTASSIUM 50 MG/1
50 TABLET ORAL DAILY
COMMUNITY

## 2024-06-10 NOTE — PROGRESS NOTES
Patient's mother Ashley instructed on the pre-operative, intra-operative, and post-operative process and  NPO status. Medication instructions and pre operative instruction sheet reviewed. Pt to complete EKG on 6/11/24. Ashley will be with pt the morning of surgery.    [Consultation] : consultation for

## 2024-06-10 NOTE — TELEPHONE ENCOUNTER
Please review chart. Pt is scheduled with Dr. Liang on 6/18/24. Pt to complete EKG 6/11/24.Thank you.

## 2024-06-11 ENCOUNTER — HOSPITAL ENCOUNTER (OUTPATIENT)
Age: 34
Discharge: HOME | End: 2024-06-11
Payer: MEDICARE

## 2024-06-11 DIAGNOSIS — I10 PRIMARY HYPERTENSION: ICD-10-CM

## 2024-06-11 PROCEDURE — 93005 ELECTROCARDIOGRAM TRACING: CPT | Performed by: ORTHOPAEDIC SURGERY

## 2024-06-11 NOTE — TELEPHONE ENCOUNTER
EKG 6/11/2024 NSR, nonspec IVCD. PMH: autism, cerebral palsy, OCD, anxiety  OK to proceed with anesthesia

## 2024-06-12 LAB
EKG ATRIAL RATE: 74 BPM
EKG P AXIS: 66 DEGREES
EKG P-R INTERVAL: 146 MS
EKG Q-T INTERVAL: 402 MS
EKG QRS DURATION: 120 MS
EKG QTC CALCULATION (BAZETT): 446 MS
EKG R AXIS: 50 DEGREES
EKG T AXIS: 11 DEGREES
EKG VENTRICULAR RATE: 74 BPM

## 2024-06-17 ENCOUNTER — ANESTHESIA EVENT (OUTPATIENT)
Dept: OPERATING ROOM | Age: 34
End: 2024-06-17
Payer: MEDICARE

## 2024-06-18 ENCOUNTER — HOSPITAL ENCOUNTER (OUTPATIENT)
Age: 34
Setting detail: OUTPATIENT SURGERY
Discharge: HOME | End: 2024-06-18
Attending: ORTHOPAEDIC SURGERY | Admitting: ORTHOPAEDIC SURGERY
Payer: MEDICARE

## 2024-06-18 ENCOUNTER — ANESTHESIA (OUTPATIENT)
Dept: OPERATING ROOM | Age: 34
End: 2024-06-18
Payer: MEDICARE

## 2024-06-18 VITALS
TEMPERATURE: 97.3 F | HEIGHT: 71 IN | DIASTOLIC BLOOD PRESSURE: 71 MMHG | OXYGEN SATURATION: 97 % | BODY MASS INDEX: 31.36 KG/M2 | WEIGHT: 224 LBS | HEART RATE: 68 BPM | RESPIRATION RATE: 18 BRPM | SYSTOLIC BLOOD PRESSURE: 109 MMHG

## 2024-06-18 DIAGNOSIS — I10 PRIMARY HYPERTENSION: Primary | ICD-10-CM

## 2024-06-18 PROCEDURE — 2500000003 HC RX 250 WO HCPCS

## 2024-06-18 PROCEDURE — 6370000000 HC RX 637 (ALT 250 FOR IP): Performed by: ORTHOPAEDIC SURGERY

## 2024-06-18 PROCEDURE — 3600000004 HC SURGERY LEVEL 4 BASE: Performed by: ORTHOPAEDIC SURGERY

## 2024-06-18 PROCEDURE — 3700000001 HC ADD 15 MINUTES (ANESTHESIA): Performed by: ORTHOPAEDIC SURGERY

## 2024-06-18 PROCEDURE — 2500000003 HC RX 250 WO HCPCS: Performed by: ORTHOPAEDIC SURGERY

## 2024-06-18 PROCEDURE — 7100000010 HC PHASE II RECOVERY - FIRST 15 MIN: Performed by: ORTHOPAEDIC SURGERY

## 2024-06-18 PROCEDURE — 2709999900 HC NON-CHARGEABLE SUPPLY: Performed by: ORTHOPAEDIC SURGERY

## 2024-06-18 PROCEDURE — 3600000014 HC SURGERY LEVEL 4 ADDTL 15MIN: Performed by: ORTHOPAEDIC SURGERY

## 2024-06-18 PROCEDURE — 6360000002 HC RX W HCPCS: Performed by: ORTHOPAEDIC SURGERY

## 2024-06-18 PROCEDURE — 6360000002 HC RX W HCPCS

## 2024-06-18 PROCEDURE — 2580000003 HC RX 258: Performed by: NURSE ANESTHETIST, CERTIFIED REGISTERED

## 2024-06-18 PROCEDURE — 2720000010 HC SURG SUPPLY STERILE: Performed by: ORTHOPAEDIC SURGERY

## 2024-06-18 PROCEDURE — 3700000000 HC ANESTHESIA ATTENDED CARE: Performed by: ORTHOPAEDIC SURGERY

## 2024-06-18 PROCEDURE — 7100000011 HC PHASE II RECOVERY - ADDTL 15 MIN: Performed by: ORTHOPAEDIC SURGERY

## 2024-06-18 PROCEDURE — 6370000000 HC RX 637 (ALT 250 FOR IP): Performed by: NURSE ANESTHETIST, CERTIFIED REGISTERED

## 2024-06-18 RX ORDER — METOCLOPRAMIDE HYDROCHLORIDE 5 MG/ML
10 INJECTION INTRAMUSCULAR; INTRAVENOUS
Status: CANCELLED | OUTPATIENT
Start: 2024-06-18 | End: 2024-06-19

## 2024-06-18 RX ORDER — ONDANSETRON 2 MG/ML
INJECTION INTRAMUSCULAR; INTRAVENOUS PRN
Status: DISCONTINUED | OUTPATIENT
Start: 2024-06-18 | End: 2024-06-18 | Stop reason: SDUPTHER

## 2024-06-18 RX ORDER — SODIUM CHLORIDE, SODIUM LACTATE, POTASSIUM CHLORIDE, CALCIUM CHLORIDE 600; 310; 30; 20 MG/100ML; MG/100ML; MG/100ML; MG/100ML
INJECTION, SOLUTION INTRAVENOUS CONTINUOUS
Status: DISCONTINUED | OUTPATIENT
Start: 2024-06-18 | End: 2024-06-18 | Stop reason: HOSPADM

## 2024-06-18 RX ORDER — FENTANYL CITRATE 50 UG/ML
25 INJECTION, SOLUTION INTRAMUSCULAR; INTRAVENOUS EVERY 5 MIN PRN
Status: CANCELLED | OUTPATIENT
Start: 2024-06-18

## 2024-06-18 RX ORDER — CEFAZOLIN SODIUM IN 0.9 % NACL 2 G/100 ML
2000 PLASTIC BAG, INJECTION (ML) INTRAVENOUS ONCE
Status: COMPLETED | OUTPATIENT
Start: 2024-06-18 | End: 2024-06-18

## 2024-06-18 RX ORDER — LIDOCAINE HYDROCHLORIDE 20 MG/ML
INJECTION, SOLUTION EPIDURAL; INFILTRATION; INTRACAUDAL; PERINEURAL PRN
Status: DISCONTINUED | OUTPATIENT
Start: 2024-06-18 | End: 2024-06-18 | Stop reason: SDUPTHER

## 2024-06-18 RX ORDER — BUPIVACAINE HYDROCHLORIDE AND EPINEPHRINE 5; 5 MG/ML; UG/ML
INJECTION, SOLUTION EPIDURAL; INTRACAUDAL; PERINEURAL PRN
Status: DISCONTINUED | OUTPATIENT
Start: 2024-06-18 | End: 2024-06-18 | Stop reason: ALTCHOICE

## 2024-06-18 RX ORDER — SODIUM CHLORIDE 9 MG/ML
INJECTION, SOLUTION INTRAVENOUS PRN
Status: CANCELLED | OUTPATIENT
Start: 2024-06-18

## 2024-06-18 RX ORDER — SODIUM CHLORIDE 0.9 % (FLUSH) 0.9 %
5-40 SYRINGE (ML) INJECTION PRN
Status: CANCELLED | OUTPATIENT
Start: 2024-06-18

## 2024-06-18 RX ORDER — SODIUM CHLORIDE 0.9 % (FLUSH) 0.9 %
5-40 SYRINGE (ML) INJECTION EVERY 12 HOURS SCHEDULED
Status: CANCELLED | OUTPATIENT
Start: 2024-06-18

## 2024-06-18 RX ORDER — KETOROLAC TROMETHAMINE 30 MG/ML
INJECTION, SOLUTION INTRAMUSCULAR; INTRAVENOUS PRN
Status: DISCONTINUED | OUTPATIENT
Start: 2024-06-18 | End: 2024-06-18 | Stop reason: SDUPTHER

## 2024-06-18 RX ORDER — DIMENHYDRINATE 50 MG
50 TABLET ORAL
Status: COMPLETED | OUTPATIENT
Start: 2024-06-18 | End: 2024-06-18

## 2024-06-18 RX ORDER — FENTANYL CITRATE 50 UG/ML
50 INJECTION, SOLUTION INTRAMUSCULAR; INTRAVENOUS EVERY 5 MIN PRN
Status: CANCELLED | OUTPATIENT
Start: 2024-06-18

## 2024-06-18 RX ORDER — ONDANSETRON 2 MG/ML
4 INJECTION INTRAMUSCULAR; INTRAVENOUS
Status: CANCELLED | OUTPATIENT
Start: 2024-06-18 | End: 2024-06-19

## 2024-06-18 RX ORDER — NALOXONE HYDROCHLORIDE 0.4 MG/ML
INJECTION, SOLUTION INTRAMUSCULAR; INTRAVENOUS; SUBCUTANEOUS PRN
Status: CANCELLED | OUTPATIENT
Start: 2024-06-18

## 2024-06-18 RX ORDER — ACETAMINOPHEN 325 MG/1
650 TABLET ORAL ONCE
Status: DISCONTINUED | OUTPATIENT
Start: 2024-06-18 | End: 2024-06-18 | Stop reason: HOSPADM

## 2024-06-18 RX ORDER — PROPOFOL 10 MG/ML
INJECTION, EMULSION INTRAVENOUS PRN
Status: DISCONTINUED | OUTPATIENT
Start: 2024-06-18 | End: 2024-06-18 | Stop reason: SDUPTHER

## 2024-06-18 RX ORDER — MIDAZOLAM HYDROCHLORIDE 1 MG/ML
INJECTION INTRAMUSCULAR; INTRAVENOUS PRN
Status: DISCONTINUED | OUTPATIENT
Start: 2024-06-18 | End: 2024-06-18 | Stop reason: SDUPTHER

## 2024-06-18 RX ORDER — HYDROCODONE BITARTRATE AND ACETAMINOPHEN 5; 325 MG/1; MG/1
1 TABLET ORAL EVERY 6 HOURS PRN
Status: CANCELLED | OUTPATIENT
Start: 2024-06-18

## 2024-06-18 RX ORDER — DIPHENHYDRAMINE HCL 25 MG
25 CAPSULE ORAL NIGHTLY
COMMUNITY

## 2024-06-18 RX ORDER — FENTANYL CITRATE 50 UG/ML
INJECTION, SOLUTION INTRAMUSCULAR; INTRAVENOUS PRN
Status: DISCONTINUED | OUTPATIENT
Start: 2024-06-18 | End: 2024-06-18 | Stop reason: SDUPTHER

## 2024-06-18 RX ORDER — ACETAMINOPHEN 325 MG/1
650 TABLET ORAL ONCE
Status: COMPLETED | OUTPATIENT
Start: 2024-06-18 | End: 2024-06-18

## 2024-06-18 RX ORDER — DEXAMETHASONE SODIUM PHOSPHATE 4 MG/ML
INJECTION, SOLUTION INTRA-ARTICULAR; INTRALESIONAL; INTRAMUSCULAR; INTRAVENOUS; SOFT TISSUE PRN
Status: DISCONTINUED | OUTPATIENT
Start: 2024-06-18 | End: 2024-06-18 | Stop reason: SDUPTHER

## 2024-06-18 RX ADMIN — FENTANYL CITRATE 25 MCG: 50 INJECTION INTRAMUSCULAR; INTRAVENOUS at 07:48

## 2024-06-18 RX ADMIN — MIDAZOLAM 1 MG: 1 INJECTION INTRAMUSCULAR; INTRAVENOUS at 07:44

## 2024-06-18 RX ADMIN — DEXAMETHASONE SODIUM PHOSPHATE 4 MG: 4 INJECTION, SOLUTION INTRA-ARTICULAR; INTRALESIONAL; INTRAMUSCULAR; INTRAVENOUS; SOFT TISSUE at 07:59

## 2024-06-18 RX ADMIN — PROPOFOL 70 MG: 10 INJECTION, EMULSION INTRAVENOUS at 07:48

## 2024-06-18 RX ADMIN — Medication 2000 MG: at 07:41

## 2024-06-18 RX ADMIN — DIMENHYDRINATE 50 MG: 50 TABLET ORAL at 07:05

## 2024-06-18 RX ADMIN — ONDANSETRON 4 MG: 2 INJECTION INTRAMUSCULAR; INTRAVENOUS at 07:59

## 2024-06-18 RX ADMIN — FENTANYL CITRATE 12.5 MCG: 50 INJECTION INTRAMUSCULAR; INTRAVENOUS at 08:02

## 2024-06-18 RX ADMIN — ACETAMINOPHEN 650 MG: 325 TABLET ORAL at 07:05

## 2024-06-18 RX ADMIN — SODIUM CHLORIDE, POTASSIUM CHLORIDE, SODIUM LACTATE AND CALCIUM CHLORIDE: 600; 310; 30; 20 INJECTION, SOLUTION INTRAVENOUS at 07:03

## 2024-06-18 RX ADMIN — PROPOFOL 180 MCG/KG/MIN: 10 INJECTION, EMULSION INTRAVENOUS at 07:49

## 2024-06-18 RX ADMIN — KETOROLAC TROMETHAMINE 30 MG: 30 INJECTION, SOLUTION INTRAMUSCULAR at 08:15

## 2024-06-18 RX ADMIN — LIDOCAINE HYDROCHLORIDE 100 MG: 20 INJECTION, SOLUTION EPIDURAL; INFILTRATION; INTRACAUDAL; PERINEURAL at 07:48

## 2024-06-18 ASSESSMENT — PAIN - FUNCTIONAL ASSESSMENT
PAIN_FUNCTIONAL_ASSESSMENT: NONE - DENIES PAIN
PAIN_FUNCTIONAL_ASSESSMENT: NONE - DENIES PAIN

## 2024-06-18 NOTE — ANESTHESIA PRE PROCEDURE
Department of Anesthesiology  Preprocedure Note       Name:  Junior Sadler   Age:  33 y.o.  :  1990                                          MRN:  [id number]         Date:  2024      Surgeon: Surgeon(s):  Matthew Linag MD    Procedure: Procedure(s):  CARPAL TUNNEL RELEASE ENDOSCOPIC    Medications prior to admission:   Prior to Admission medications    Medication Sig Start Date End Date Taking? Authorizing Provider   diphenhydrAMINE (BENADRYL) 25 MG capsule Take 1 capsule by mouth nightly   Yes Molly Carlson MD   losartan (COZAAR) 50 MG tablet Take 1 tablet by mouth daily   Yes Molly Carlson MD   cetirizine (ZYRTEC) 5 MG tablet Take 1 tablet by mouth daily   Yes Molly Carlson MD   topiramate (TOPAMAX) 25 MG tablet Take 1 tablet by mouth 2 times daily    Molly Carlson MD   Melatonin 10 MG TABS Take 10 mg by mouth as needed    Molly Carlson MD   PARoxetine (PAXIL) 40 MG tablet Take 1 tablet by mouth every morning    Molly Carlson MD   ibuprofen (ADVIL;MOTRIN) 400 MG tablet Take 1 tablet by mouth every 6 hours as needed    Molly Carlson MD       Current medications:    Current Facility-Administered Medications   Medication Dose Route Frequency Provider Last Rate Last Admin    ceFAZolin (ANCEF) 2000 mg in 0.9% sodium chloride 100 mL IVPB  2,000 mg IntraVENous Once Matthew Liang MD        lactated ringers IV soln infusion   IntraVENous Continuous Matthew Liang MD        acetaminophen (TYLENOL) tablet 650 mg  650 mg Oral Once Matthew Liang MD        lactated ringers IV soln infusion   IntraVENous Continuous Marielle Lee APRN - CRNA 100 mL/hr at 24 0703 New Bag at 24 0703       Allergies:    Allergies   Allergen Reactions    Latex Dermatitis    Doxycycline Nausea Only    Sulfamethoxazole-Trimethoprim Nausea Only       Problem List:    Patient Active Problem List   Diagnosis Code    Acute erosive gastritis K29.00    Anxiety disorder due to

## 2024-06-18 NOTE — ANESTHESIA POSTPROCEDURE EVALUATION
Department of Anesthesiology  Postprocedure Note    Patient: Junior Sadler  MRN: [id number]  YOB: 1990  Date of evaluation: 6/18/2024    Procedure Summary       Date: 06/18/24 Room / Location: [address]    Anesthesia Start: 0743 Anesthesia Stop: 0826    Procedure: CARPAL TUNNEL RELEASE ENDOSCOPIC (Right) Diagnosis:       Carpal tunnel syndrome, right      (Carpal tunnel syndrome, right [G56.01])    Surgeons: Matthew Liang MD Responsible Provider: Scarlett Ogden APRN - CRNA    Anesthesia Type: general ASA Status: 2            Anesthesia Type: No value filed.    Omid Phase I: Omid Score: 10    Omid Phase II: Omid Score: 10    Anesthesia Post Evaluation    Patient location during evaluation: PACU  Patient participation: complete - patient participated  Level of consciousness: awake and alert  Airway patency: patent  Nausea & Vomiting: no nausea and no vomiting  Cardiovascular status: blood pressure returned to baseline  Respiratory status: acceptable  Hydration status: euvolemic  Pain management: adequate and satisfactory to patient    No notable events documented.

## 2024-06-18 NOTE — DISCHARGE INSTRUCTIONS
SAME DAY SURGERY DISCHARGE INSTRUCTIONS    1.  Do not drive or operate hazardous machinery for 24 hours.    2.  Do not make important personal or business decisions for 24 hours.    3.  Do not drink alcoholic beverages.    4.  Do not use tobacco products.    5.  Eat light foods (Jell-O, soups, etc....) and drink plenty of fluids (water, Sprite, etc...) up to 8 glasses per day, as you can tolerate.    6.  If your bandages become soaked with bright red blood, place another dressing pad over your bandages.  (DO NOT remove original bandage.)  Call your surgeon for further instructions.  A small amount of bright red blood is to be expected.    7.  Limit your activities for 24 hours.  Do not engage in heavy work until your surgeon gives you permission.      8.  Report the following signs or any questions regarding your physical condition to your surgeon immediately:    Excessive swelling of, or around the wound area.    Redness.    Temperature of 100 degrees (F) or above.    Excessive pain.    9.  Call your surgeon at the office, [phone], for any questions regarding your surgery.  For urgent concerns after office hours, you may call Dr. Liang on his cell phone, [phone].    10.  Follow-up with your surgeon as directed.      SPECIAL INSTRUCTIONS AND MEDICATIONS    1.  Elevate arm/leg on pillow for comfort.    2.  Move fingers/toes to improve circulation.    3.  Use ibuprofen and/or Tylenol for pain.  You may also use prescribed pain pill (which also includes tylenol) as directed by the doctor.  Do not take more than 3,000 mg tylenol in a day.    4.  Keep your dressing on and dry unless instructed differently by your doctor.

## 2024-06-18 NOTE — OP NOTE
Operative Note      Patient: Junior Sadler  YOB: 1990  MRN: [id number]    DATE OF SURGERY: 6/18/2024    PREOPERATIVE DIAGNOSIS:  Right carpal tunnel syndrome    POSTOPERATIVE DIAGNOSIS:  Same    PROCEDURE:  Right endoscopic carpal tunnel release    SURGEON: Matthew Liang M.D.    ANESTHESIA: MAC with local    EBL: Minimal    TOURNIQUET TIME: 14 minutes at 250 mm Hg    COMPLICATIONS:  None    DISPOSITION: Stable to the recovery room.    INDICATIONS FOR PROCEDURE: Patient presented with history and physical exam findings consistent with carpal tunnel syndrome. Diagnosis was confirmed with electrodiagnostic testing. Patient elected to proceed with surgical release. Informed consent was obtained following discussion of risks and benefits.    DESCRIPTION OF PROCEDURE: Patient was identified in preop holding area. With the patient's agreement, the operative extremity was marked as site of surgery. The patient was taken to the operating room and placed supine on the operative table. Prophylactic IV antibiotics were administered. A well-padded tourniquet was applied to the operative extremity.  MAC was commenced by anesthesia.  Operative extremity was then prepped and draped in usual sterile fashion.     Preoperative timeout taken to ensure the proper patient, surgical site and procedure. After all parties were in agreement, local anesthetic was infiltrated subcutaneously along the anticipated path of the incision.  The extremity was exsanguinated with an Esmarch bandage. Tourniquet was inflated. We then created a longitudinal incision centered over the distal edge of the transverse carpal ligament. Sharp dissection was carried down through the skin and subcutaneous fat. Superficial palmar fascia was identified and was split longitudinally exposing the transverse carpal ligament. The ligament was then incised longitudinally at the distal end.  The endoscope was then placed into the wound allowing

## 2024-06-21 ENCOUNTER — HOSPITAL ENCOUNTER (OUTPATIENT)
Dept: OCCUPATIONAL THERAPY | Age: 34
Setting detail: THERAPIES SERIES
Discharge: HOME | End: 2024-06-21
Payer: MEDICARE

## 2024-06-21 PROCEDURE — 97166 OT EVAL MOD COMPLEX 45 MIN: CPT

## 2024-06-21 NOTE — PLAN OF CARE
[facility] and [facility]         Phone: [phone]  Fax: [phone]    Outpatient Occupational Therapy Plan of Care    Date: 2024  Patient: Junior Sadler  : 1990  Account #: [id number]   Referring provider: Matthew Liang MD  CSN#: [id number]  Diagnosis: Right CTS    Objective:     Hand dominance:  Right  Affected extremity: Right     FINE MOTOR COORDINATION    Right (seconds) Left (seconds)   9 Hole Peg Test 33.00 25.00      STRENGTH (AVERAGE SCORE)    Right   (pounds) Left   (pounds)    12.6 45.3   Lateral pinch 13.0 21.3   Emery pinch 17.0 15.0   Tip pinch 14.3 12.0   *bilateral  strength is normally symmetrical or up to 10% stronger on the dominant extremity, depending on the individual's activity level     WRIST/FOREARM        Right ROM (degrees) Left ROM (degrees)   Flexion (70-80) WFL WFL   Extension (60-70)  WFL WFL   Radial Deviation (20-25) WFL WFL   Ulnar Deviation (30-40) WFL WFL   Forearm Pronation (80-90) WFL WFL   Forearm Supination (80-90) WFL WFL      ELBOW    Right ROM (degrees) Left ROM (degrees)   Flexion (145-150) WFL WFL   Extension (0) WFL WFL       RIGHT DIGITS       2nd digit ROM (degrees) 3rd digit ROM (degrees) 4th digit ROM (degrees) 5th digit ROM (degrees)   MCP extension/flexion (0-90) WFL WFL WFL WFL   PIP extension/flexion (0-100) WFL WFL WFL WFL   DIP extension/flexion (0-70) WFL WFL WFL WFL   (+ is used for hyperextension / - is used for extensor lag per ASHT)      LEFT DIGITS    2nd digit ROM (degrees) 3rd digit ROM (degrees) 4th digit ROM (degrees) 5th digit ROM (degrees)   MCP extension/flexion (0-90) WFL WFL WFL WFL   PIP extension/flexion (0-100) WFL WFL WFL WFL   DIP extension/flexion (0-70) WFL WFL WFL WFL   (+ is used for hyperextension / - is used for extensor lag per ASHT)        THUMB                           Right  (degrees) Left  (degrees)   MCP extension/flexion (0-50) WFL WFL   IP extension/flexion (0-80) WFL

## 2024-06-21 NOTE — THERAPY EVALUATION
[facility] and [facility]         Phone: [phone]  Fax: [phone]    Outpatient Occupational Therapy Evaluation    Date: 2024  Patient: Junior Sadler  : 1990  Account #: [id number]   Referring provider: Matthew Liang MD  Diagnosis: Right CTS   Treatment Diagnosis: Right CTS    Additional Pertinent Hx: Referred to OP OT services due to right CTS. Reported that he has been experiencing carpal tunnel symptoms since , but that he recently underwent surgery on 24. Was unable to verbalize any current restrictions/precautions, but stated that his stitches will be removed on 24. Patient is employed at [employer] and stated that he will be off-duty until 24.      Onset date: 2024  Insurance information: Medicare  Total # of visits approved: 12 (based on medical necessity)   Total # of visits to date: 1     Subjective:     Arrived to therapy evaluation independently with ace wrap around right wrist/thumb area.    Pre Treatment Pain: Yes    Location:  Anterior aspect of right wrist     Pain Rating: (0-10 scale) 8/10*  *behavior did not correlate with stated pain level    Post Treatment Pain: No    Location:  N/A     Pain Rating: (0-10 scale) 0/10    Objective:     Hand dominance:  Right  Affected extremity: Right    FINE MOTOR COORDINATION   Right (seconds) Left (seconds)   9 Hole Peg Test 33.00 25.00      STRENGTH (AVERAGE SCORE)    Right   (pounds) Left   (pounds)    12.6 45.3   Lateral pinch 13.0 21.3   Emery pinch 17.0 15.0   Tip pinch 14.3 12.0   *bilateral  strength is normally symmetrical or up to 10% stronger on the dominant extremity, depending on the individual's activity level    WRIST/FOREARM        Right ROM (degrees) Left ROM (degrees)   Flexion (70-80) WFL WFL   Extension (60-70)  WFL WFL   Radial Deviation (20-25) WFL WFL   Ulnar Deviation (30-40) WFL WFL   Forearm Pronation (80-90) WFL WFL   Forearm Supination (80-90)

## 2024-06-24 ENCOUNTER — HOSPITAL ENCOUNTER (OUTPATIENT)
Dept: OCCUPATIONAL THERAPY | Age: 34
Setting detail: THERAPIES SERIES
Discharge: HOME | End: 2024-06-24
Payer: MEDICARE

## 2024-06-24 PROCEDURE — 97110 THERAPEUTIC EXERCISES: CPT

## 2024-06-24 NOTE — PROGRESS NOTES
[facility]         Phone: [phone]  Fax: [phone]    Outpatient Occupational Therapy Daily Note    Date:  2024  Patient Name:  Junior Sadler      :  1990    MRN: [id number]  Referring provider: Matthew Liang MD    Insurance: Medicare  Diagnosis: Right CTS   Onset Date: 24   Next  Appt: 24   Visit# / total visits: ; Progress note for Medicare patient due at visit 10    Cancels/No Shows: 0/0    Subjective:     Arrived to therapy session independently.    Pre Treatment Pain:    [x] Yes  [] No  Location: surgical site     Pain Rating: (0-10 scale) 9/10  Post Treatment Pain:  [x] Yes  [] No  Location: surgical site       Pain Rating: (0-10 scale) 6/10    Objective:     UPPER EXTREMITY FUNCTIONAL SCALE (UEFS)   RUE/LUE   Score 23/80     Assessment:     Completed exercises/modalities as documented in Exercise Flowsheet with good tolerance overall.  Attempted to start session with moist heat to warm up tissue (in preparation for ther ex), however patient requested to discontinue it approximately 2 minutes/30 seconds into application.   Re-administered the UEFS this date due to patient accidentally skipping the \"grooming your hair\" item during the evaluation.  Rated this item as \"no difficulty\" with new score reflected above.   Reviewed the green resistive foam block HEP with patient requiring maximum verbal cues for the correct technique.  Will continue to address goals and progress patient as able.    [x] Progressing toward goals  [] No change  [] Regressing from goals  [x] Patient would continue to benefit from skilled occupational therapy services in order to increase functional use of RUE.    Patient Education:     [x] Yes  [] No    Method of Education:   [x] Verbal  [x] Demo  [x] Written  Re: Resistive foam block HEP  Comprehension of Education:  [] Verbalizes understanding  [] Demonstrates understanding  [] Needs review    Goals:     Time

## 2024-06-26 ENCOUNTER — HOSPITAL ENCOUNTER (OUTPATIENT)
Dept: OCCUPATIONAL THERAPY | Age: 34
Setting detail: THERAPIES SERIES
Discharge: HOME | End: 2024-06-26
Payer: MEDICARE

## 2024-06-26 PROCEDURE — 97110 THERAPEUTIC EXERCISES: CPT

## 2024-06-26 NOTE — PROGRESS NOTES
[facility] and [facility]         Phone: [phone]  Fax: [phone]    Outpatient Occupational Therapy Daily Note    Date:  2024  Patient Name:  Junior Sadler      :  1990    MRN: [id number]  Referring provider: Matthew Liang MD    Insurance: Medicare  Diagnosis: Right CTS   Onset Date: 24   Next  Appt: 24   Visit# / total visits: 3/12; Progress note for Medicare patient due at visit 10    Cancels/No Shows: 0/0    Subjective:     Arrived to therapy session independently.    Pre Treatment Pain:    [x] Yes  [] No  Location: surgical site     Pain Rating: (0-10 scale) 3/10  Post Treatment Pain:  [x] Yes  [] No  Location: surgical site       Pain Rating: (0-10 scale) 1/10    Objective:     N/A    Assessment:     Completed exercises/modalities as documented in Exercise Flowsheet with good tolerance overall.  Reported decreased pain this date.  Will continue to address goals and progress patient as able.    [x] Progressing toward goals  [] No change  [] Regressing from goals  [x] Patient would continue to benefit from skilled occupational therapy services in order to increase functional use of RUE.    Patient Education:     [] Yes  [x] No    Method of Education: [] Verbal  [] Demo  [] Written  Re:   Comprehension of Education:  [] Verbalizes understanding  [] Demonstrates understanding  [] Needs review    Goals:     Time Frame for Short Term Goals: 6 visits  Patient will:  1. Be educated on and perform resistive foam block HEP independently.        Time Frame for Long Term Goals: 12 visits  Patient will:  1. Improve right  strength to 45.3 pounds to increase ease with laundering clothes.  2. Improve right lateral pinch strength to 21.3 pounds to increase ease with preparing food.  3. Increase UEFS score to 28 or more points to demonstrate improved functional abilities.   4. Improve fine motor coordination for shoe tying AEB completion of right 9 hole peg test in 25

## 2024-07-02 ENCOUNTER — APPOINTMENT (OUTPATIENT)
Dept: OCCUPATIONAL THERAPY | Age: 34
End: 2024-07-02
Payer: MEDICARE

## 2024-07-05 ENCOUNTER — HOSPITAL ENCOUNTER (OUTPATIENT)
Dept: OCCUPATIONAL THERAPY | Age: 34
Setting detail: THERAPIES SERIES
Discharge: HOME | End: 2024-07-05
Payer: MEDICARE

## 2024-07-05 PROCEDURE — 97110 THERAPEUTIC EXERCISES: CPT

## 2024-07-05 NOTE — PROGRESS NOTES
[facility] and [facility]         Phone: [phone]  Fax: [phone]    Outpatient Occupational Therapy Daily Note    Date:  2024  Patient Name:  Junior Sadler      :  1990    MRN: [id number]  Referring provider: Matthew Liang MD                  Insurance: Medicare  Diagnosis: Right CTS   Onset Date: 24      Next  Appt: 24   Visit# / total visits: ; Progress note for Medicare patient due at visit 10               Cancels/No Shows: 0 / 0       Subjective:    Patient presents to session independently this date. Patient reports he would like to be discharged from OT services following 6th visit.      Pre Treatment Pain:    [] Yes  [x] No    Location: None        Pain Rating: (0-10 scale) 0/10      Post Treatment Pain:  [] Yes  [x] No     Location: None         Pain Rating: (0-10 scale) 0/10    Objective:     N/A    Assessment:     Completed exercises/modalities as documented in Exercise Flowsheet with good tolerance overall.  Required step by step instructions to complete exercises using RUE, often wants to use LUE to complete tasks.   Will continue to address goals and progress patient as able.    [x] Progressing toward goals  [] No change  [] Regressing from goals  [x] Patient would continue to benefit from skilled occupational therapy services in order to improve functional use of RUE to assist with ADL and IADL tasks.     Patient Education:     [x] Yes  [] No    Method of Education:   [x] Verbal  [] Demo  [] Written  Re: POC   Comprehension of Education:  [x] Verbalizes understanding  [] Demonstrates understanding  [] Needs review    Goals:     Time Frame for Short Term Goals: 6 visits  Patient will:  1. Be educated on and perform resistive foam block HEP independently.        Time Frame for Long Term Goals: 12 visits  Patient will:  1. Improve right  strength to 45.3 pounds to increase ease with laundering clothes.  2. Improve right lateral pinch

## 2024-07-08 ENCOUNTER — HOSPITAL ENCOUNTER (OUTPATIENT)
Dept: OCCUPATIONAL THERAPY | Age: 34
Setting detail: THERAPIES SERIES
Discharge: HOME | End: 2024-07-08
Payer: MEDICARE

## 2024-07-11 ENCOUNTER — APPOINTMENT (OUTPATIENT)
Dept: OCCUPATIONAL THERAPY | Age: 34
End: 2024-07-11
Payer: MEDICARE

## 2024-07-18 ENCOUNTER — HOSPITAL ENCOUNTER (OUTPATIENT)
Dept: OCCUPATIONAL THERAPY | Age: 34
Setting detail: THERAPIES SERIES
Discharge: HOME | End: 2024-07-18
Payer: MEDICARE

## 2024-07-18 NOTE — PROGRESS NOTES
Occupational Therapy  [facility]    Date: 2024  Patient Name: Junior Mayersalejandrakristiesavi        : 1990     Thinks he is doing better and does not need to be seen today.      Maty S Shock Date: 2024

## 2024-12-13 PROCEDURE — 93010 ELECTROCARDIOGRAM REPORT: CPT | Performed by: INTERNAL MEDICINE

## 2025-05-23 ENCOUNTER — HOSPITAL ENCOUNTER (EMERGENCY)
Age: 35
Discharge: HOME | End: 2025-05-23
Attending: EMERGENCY MEDICINE
Payer: MEDICARE

## 2025-05-23 ENCOUNTER — APPOINTMENT (OUTPATIENT)
Dept: GENERAL RADIOLOGY | Age: 35
End: 2025-05-23
Payer: MEDICARE

## 2025-05-23 VITALS
BODY MASS INDEX: 31.38 KG/M2 | DIASTOLIC BLOOD PRESSURE: 87 MMHG | OXYGEN SATURATION: 97 % | WEIGHT: 225 LBS | RESPIRATION RATE: 20 BRPM | HEART RATE: 76 BPM | SYSTOLIC BLOOD PRESSURE: 144 MMHG | TEMPERATURE: 98 F

## 2025-05-23 DIAGNOSIS — S91.114A LACERATION OF LESSER TOE OF RIGHT FOOT WITHOUT FOREIGN BODY PRESENT OR DAMAGE TO NAIL, INITIAL ENCOUNTER: Primary | ICD-10-CM

## 2025-05-23 PROCEDURE — 6360000002 HC RX W HCPCS: Performed by: EMERGENCY MEDICINE

## 2025-05-23 PROCEDURE — 6370000000 HC RX 637 (ALT 250 FOR IP): Performed by: EMERGENCY MEDICINE

## 2025-05-23 PROCEDURE — 12001 RPR S/N/AX/GEN/TRNK 2.5CM/<: CPT

## 2025-05-23 PROCEDURE — 99283 EMERGENCY DEPT VISIT LOW MDM: CPT

## 2025-05-23 PROCEDURE — 73630 X-RAY EXAM OF FOOT: CPT

## 2025-05-23 RX ORDER — GINSENG 100 MG
CAPSULE ORAL ONCE
Status: COMPLETED | OUTPATIENT
Start: 2025-05-23 | End: 2025-05-23

## 2025-05-23 RX ORDER — CLINDAMYCIN HYDROCHLORIDE 300 MG/1
300 CAPSULE ORAL 3 TIMES DAILY
Qty: 21 CAPSULE | Refills: 0 | Status: SHIPPED | OUTPATIENT
Start: 2025-05-23 | End: 2025-05-30

## 2025-05-23 RX ORDER — DOXYCYCLINE HYCLATE 100 MG
100 TABLET ORAL 2 TIMES DAILY
Qty: 14 TABLET | Refills: 0 | Status: SHIPPED | OUTPATIENT
Start: 2025-05-23 | End: 2025-05-23

## 2025-05-23 RX ORDER — LIDOCAINE HYDROCHLORIDE 10 MG/ML
5 INJECTION, SOLUTION INFILTRATION; PERINEURAL ONCE
Status: COMPLETED | OUTPATIENT
Start: 2025-05-23 | End: 2025-05-23

## 2025-05-23 RX ORDER — DOXYCYCLINE 100 MG/1
100 CAPSULE ORAL ONCE
Status: DISCONTINUED | OUTPATIENT
Start: 2025-05-23 | End: 2025-05-23

## 2025-05-23 RX ORDER — CLINDAMYCIN HYDROCHLORIDE 150 MG/1
300 CAPSULE ORAL ONCE
Status: COMPLETED | OUTPATIENT
Start: 2025-05-23 | End: 2025-05-23

## 2025-05-23 RX ADMIN — LIDOCAINE HYDROCHLORIDE 5 ML: 10 INJECTION, SOLUTION INFILTRATION; PERINEURAL at 18:26

## 2025-05-23 RX ADMIN — BACITRACIN 1 PACKET: 500 OINTMENT TOPICAL at 19:40

## 2025-05-23 RX ADMIN — CLINDAMYCIN HYDROCHLORIDE 300 MG: 150 CAPSULE ORAL at 19:40

## 2025-05-23 ASSESSMENT — PAIN SCALES - GENERAL: PAINLEVEL_OUTOF10: 1

## 2025-05-23 ASSESSMENT — PAIN DESCRIPTION - LOCATION: LOCATION: TOE (COMMENT WHICH ONE)

## 2025-05-23 ASSESSMENT — PAIN DESCRIPTION - ORIENTATION: ORIENTATION: RIGHT

## 2025-05-23 ASSESSMENT — PAIN DESCRIPTION - PAIN TYPE: TYPE: ACUTE PAIN

## 2025-05-23 ASSESSMENT — PAIN - FUNCTIONAL ASSESSMENT: PAIN_FUNCTIONAL_ASSESSMENT: 0-10

## 2025-05-23 ASSESSMENT — PAIN DESCRIPTION - DESCRIPTORS: DESCRIPTORS: SORE

## 2025-05-23 NOTE — ED PROVIDER NOTES
ED NOTE          34 y.o. male to ED with complaint of right fourth toe laceration.  Incident happened at approximately 4:00 this morning.  Patient states that he was walking and struck his foot on something.  Noted pain immediately, noted bleeding from the site.  Has history of autism, mother was unaware of the wound until she returned home from work today, and then brought him directly to the emergency department.  Patient denies any pain at this time.  Patient is ambulatory without assistance.  Last tetanus shot was in April 2023.          Past Medical History:   Diagnosis Date    Anxiety     Autistic disorder     Cerebral palsy (HCC)     Hypertension     Obsessive compulsive disorder              Social History     Tobacco Use    Smoking status: Never    Smokeless tobacco: Never   Vaping Use    Vaping status: Never Used   Substance Use Topics    Alcohol use: No    Drug use: Never              A complete review of systems was performed and is negative for other complaint not mentioned in the HPI.     PE:  BP (!) 144/87   Pulse 76   Temp 98 °F (36.7 °C) (Oral)   Resp 20   Wt 102.1 kg (225 lb)   SpO2 97%   BMI 31.38 kg/m²    GEN: Alert, oriented, no apparent distress  HEENT:  DICKSON, EOMI, OP clear, post OP symmetric  NECK: supple  CV: RRR, no m/r/g, distal pulses equal bilaterally  PULM: CTA bilaterally, no w/r/r  Extremities: Right lower extremity: 1 cm laceration to the plantar aspect of the right fourth toe, no visible foreign body, no active bleeding, able to flex and extend the toe, tenderness to palpation over the laceration site only   NEURO: alert, oriented, strength and sensation intact to all extremities, no focal deficits        ED Course & MDM:     The patient has received a medical screening examination and within reasonable clinical confidence has been identified and stabilized.     Nursing noted reviewed  Vital signs reviewed     Radiology studies reviewed and personally interpreted -x-ray of the      Area cleansed with:  Povidone-iodine    Amount of cleaning:  Extensive    Irrigation solution:  Sterile saline    Irrigation method:  Pressure wash    Visualized foreign bodies/material removed: no    Skin repair:     Repair method:  Sutures    Suture size:  4-0 and 3-0    Suture material:  Nylon    Suture technique:  Simple interrupted    Number of sutures:  3  Approximation:     Approximation:  Close  Repair type:     Repair type:  Simple  Post-procedure details:     Dressing:  Antibiotic ointment and sterile dressing    Procedure completion:  Tolerated well, no immediate complications  Comments:      Given location of wound, larger bites taken to approximate nearby tissues and close wound.    Erick Herrera MD     Impression:      ICD-10-CM    1. Laceration of lesser toe of right foot without foreign body present or damage to nail, initial encounter  S91.114A          Disposition:  home        Erick Herrera MD      (Please note that portions of this note were completed with a voice recognition program. Efforts were made to edit the dictations but occasionally words are mis-transcribed.)             Erick Herrera MD  05/23/25 1933

## 2025-05-23 NOTE — ED NOTES
Patient stitches on bottom of right foot covered with bacitracin, non adherent dressing and wrapped with gauze wrap per MD orders. Supplies for at home care sent home with patient. Education on at home care provided and reviewed with patient and patients mother. Medication administration education completed. Patient and patient's mother verbalized understanding.

## (undated) DEVICE — Z DISCONTINUED USE 2881376 SUTURE ETHILON SZ 4-0 L18IN NONABSORBABLE BLK L19MM PS-2 3/8 1667H

## (undated) DEVICE — STRAP,POSITIONING,KNEE/BODY,FOAM,4X60": Brand: MEDLINE

## (undated) DEVICE — UNDERGLOVE SURG SZ 8 BLU LTX FREE SYN POLYISOPRENE POLYMER

## (undated) DEVICE — MERCY TIFFIN HAND: Brand: MEDLINE INDUSTRIES, INC.

## (undated) DEVICE — SOLUTION IRRIG 1000ML 0.9% SOD CHL USP POUR PLAS BTL

## (undated) DEVICE — BANDAGE COMPR L5YDXW2IN FOAM CO FLX

## (undated) DEVICE — INSTRUMENT ORTH L6IN LNG S STL SGL IN TOME

## (undated) DEVICE — DRESSING PETRO W3XL3IN OIL EMUL N ADH GZ KNIT IMPREG CELOS

## (undated) DEVICE — PADDING CAST W2INXL4YD COT LO LINTING WYTEX

## (undated) DEVICE — GLOVE SURG SZ 75 CRM LTX FREE POLYISOPRENE POLYMER BEAD ANTI

## (undated) DEVICE — APPLICATOR MEDICATED 26 CC SOLUTION HI LT ORNG CHLORAPREP

## (undated) DEVICE — PADDING,UNDERCAST,COTTON, 4"X4YD STERILE: Brand: MEDLINE

## (undated) DEVICE — 60-7075-103 TRNQT,SPSB,PLC RED: Brand: MEDLINE RENEWAL